# Patient Record
Sex: MALE | Race: WHITE | NOT HISPANIC OR LATINO
[De-identification: names, ages, dates, MRNs, and addresses within clinical notes are randomized per-mention and may not be internally consistent; named-entity substitution may affect disease eponyms.]

---

## 2017-10-25 ENCOUNTER — APPOINTMENT (OUTPATIENT)
Dept: PHARMACY | Facility: CLINIC | Age: 77
End: 2017-10-25
Payer: MEDICARE

## 2017-10-25 ENCOUNTER — APPOINTMENT (OUTPATIENT)
Dept: OTOLARYNGOLOGY | Facility: CLINIC | Age: 77
End: 2017-10-25
Payer: MEDICARE

## 2017-10-25 VITALS — DIASTOLIC BLOOD PRESSURE: 81 MMHG | SYSTOLIC BLOOD PRESSURE: 151 MMHG | HEART RATE: 91 BPM | OXYGEN SATURATION: 94 %

## 2017-10-25 DIAGNOSIS — H61.21 IMPACTED CERUMEN, RIGHT EAR: ICD-10-CM

## 2017-10-25 PROCEDURE — V5299A: CUSTOM | Mod: NC

## 2017-10-25 PROCEDURE — 99213 OFFICE O/P EST LOW 20 MIN: CPT | Mod: 25

## 2017-10-25 PROCEDURE — 69210 REMOVE IMPACTED EAR WAX UNI: CPT

## 2017-10-25 PROCEDURE — 92550 TYMPANOMETRY & REFLEX THRESH: CPT

## 2017-10-25 PROCEDURE — 92557 COMPREHENSIVE HEARING TEST: CPT

## 2018-02-12 ENCOUNTER — APPOINTMENT (OUTPATIENT)
Dept: OTOLARYNGOLOGY | Facility: CLINIC | Age: 78
End: 2018-02-12
Payer: SELF-PAY

## 2018-02-12 PROCEDURE — V5299A: CUSTOM | Mod: NC

## 2018-02-26 ENCOUNTER — APPOINTMENT (OUTPATIENT)
Dept: PHARMACY | Facility: CLINIC | Age: 78
End: 2018-02-26
Payer: SELF-PAY

## 2018-02-26 PROCEDURE — V5299A: CUSTOM | Mod: NC

## 2018-04-25 ENCOUNTER — APPOINTMENT (OUTPATIENT)
Dept: PHARMACY | Facility: CLINIC | Age: 78
End: 2018-04-25
Payer: SELF-PAY

## 2018-04-25 ENCOUNTER — APPOINTMENT (OUTPATIENT)
Dept: OTOLARYNGOLOGY | Facility: CLINIC | Age: 78
End: 2018-04-25
Payer: MEDICARE

## 2018-04-25 VITALS
TEMPERATURE: 97.9 F | DIASTOLIC BLOOD PRESSURE: 82 MMHG | OXYGEN SATURATION: 97 % | SYSTOLIC BLOOD PRESSURE: 137 MMHG | HEART RATE: 77 BPM

## 2018-04-25 PROCEDURE — 99213 OFFICE O/P EST LOW 20 MIN: CPT | Mod: 25

## 2018-04-25 PROCEDURE — 92567 TYMPANOMETRY: CPT

## 2018-04-25 PROCEDURE — 92557 COMPREHENSIVE HEARING TEST: CPT

## 2018-04-25 PROCEDURE — 69210 REMOVE IMPACTED EAR WAX UNI: CPT

## 2018-04-25 PROCEDURE — V5299A: CUSTOM | Mod: NC

## 2018-07-31 ENCOUNTER — APPOINTMENT (OUTPATIENT)
Dept: OTOLARYNGOLOGY | Facility: CLINIC | Age: 78
End: 2018-07-31
Payer: SELF-PAY

## 2018-07-31 PROCEDURE — V5299A: CUSTOM | Mod: NC

## 2018-10-24 ENCOUNTER — APPOINTMENT (OUTPATIENT)
Dept: OTOLARYNGOLOGY | Facility: CLINIC | Age: 78
End: 2018-10-24
Payer: MEDICARE

## 2018-10-24 VITALS
TEMPERATURE: 98.3 F | HEART RATE: 79 BPM | DIASTOLIC BLOOD PRESSURE: 85 MMHG | OXYGEN SATURATION: 95 % | RESPIRATION RATE: 15 BRPM | SYSTOLIC BLOOD PRESSURE: 130 MMHG

## 2018-10-24 PROCEDURE — 69210 REMOVE IMPACTED EAR WAX UNI: CPT

## 2018-10-24 PROCEDURE — 92550 TYMPANOMETRY & REFLEX THRESH: CPT

## 2018-10-24 PROCEDURE — 92557 COMPREHENSIVE HEARING TEST: CPT

## 2018-10-24 PROCEDURE — 99213 OFFICE O/P EST LOW 20 MIN: CPT | Mod: 25

## 2019-01-02 ENCOUNTER — APPOINTMENT (OUTPATIENT)
Dept: PHARMACY | Facility: CLINIC | Age: 79
End: 2019-01-02
Payer: SELF-PAY

## 2019-01-02 PROCEDURE — V5299D: CUSTOM

## 2019-03-12 ENCOUNTER — APPOINTMENT (OUTPATIENT)
Dept: PHARMACY | Facility: CLINIC | Age: 79
End: 2019-03-12
Payer: SELF-PAY

## 2019-03-12 PROCEDURE — V5299D: CUSTOM

## 2019-09-17 ENCOUNTER — APPOINTMENT (OUTPATIENT)
Dept: OTOLARYNGOLOGY | Facility: CLINIC | Age: 79
End: 2019-09-17
Payer: MEDICARE

## 2019-09-17 ENCOUNTER — APPOINTMENT (OUTPATIENT)
Dept: PHARMACY | Facility: CLINIC | Age: 79
End: 2019-09-17
Payer: SELF-PAY

## 2019-09-17 PROCEDURE — 92557 COMPREHENSIVE HEARING TEST: CPT

## 2019-09-17 PROCEDURE — 92550 TYMPANOMETRY & REFLEX THRESH: CPT

## 2019-09-17 PROCEDURE — V5299A: CUSTOM | Mod: NC

## 2019-10-03 ENCOUNTER — APPOINTMENT (OUTPATIENT)
Dept: OTOLARYNGOLOGY | Facility: CLINIC | Age: 79
End: 2019-10-03
Payer: MEDICARE

## 2019-10-03 VITALS
TEMPERATURE: 97.9 F | HEART RATE: 69 BPM | DIASTOLIC BLOOD PRESSURE: 94 MMHG | OXYGEN SATURATION: 97 % | SYSTOLIC BLOOD PRESSURE: 157 MMHG

## 2019-10-03 PROCEDURE — 99213 OFFICE O/P EST LOW 20 MIN: CPT | Mod: 25

## 2019-10-03 PROCEDURE — 69210 REMOVE IMPACTED EAR WAX UNI: CPT

## 2019-10-03 NOTE — PHYSICAL EXAM
[de-identified] : b moderate obstructinc cerumen removed atraumatically with suction [Normal] : no masses and lesions seen, face is symmetric

## 2019-10-03 NOTE — PROCEDURE
[Cerumen Impaction] : Cerumen Impaction [Same] : same as the Pre Op Dx. [FreeTextEntry6] : b obstructing cerumen removed atraumatically with suction [] : Removal of Cerumen

## 2019-10-03 NOTE — HISTORY OF PRESENT ILLNESS
[de-identified] : followup 78 yo man with b snhl and tinnitus. He is a b ha user and finds the HAs suppress his tinnitus but when he takes off the left one, it can be louder. He denies vertigo or pain. He also notes that even with adjustments he has trouble hearing over bckgd noise. Last adjusted here last month.

## 2020-08-28 ENCOUNTER — APPOINTMENT (OUTPATIENT)
Dept: PHARMACY | Facility: CLINIC | Age: 80
End: 2020-08-28
Payer: SELF-PAY

## 2020-08-28 PROCEDURE — V5254B: CUSTOM

## 2020-08-28 PROCEDURE — V5110: CPT

## 2020-08-28 PROCEDURE — V5267F: CUSTOM

## 2020-08-28 PROCEDURE — V5266B: CUSTOM

## 2020-09-08 ENCOUNTER — APPOINTMENT (OUTPATIENT)
Dept: PHARMACY | Facility: CLINIC | Age: 80
End: 2020-09-08
Payer: MEDICARE

## 2020-09-08 PROCEDURE — V5299A: CUSTOM | Mod: NC

## 2020-11-04 ENCOUNTER — RX RENEWAL (OUTPATIENT)
Age: 80
End: 2020-11-04

## 2020-11-04 ENCOUNTER — APPOINTMENT (OUTPATIENT)
Dept: UROLOGY | Facility: CLINIC | Age: 80
End: 2020-11-04
Payer: MEDICARE

## 2020-11-04 VITALS — DIASTOLIC BLOOD PRESSURE: 72 MMHG | TEMPERATURE: 97.5 F | SYSTOLIC BLOOD PRESSURE: 118 MMHG | HEART RATE: 99 BPM

## 2020-11-04 PROCEDURE — 99204 OFFICE O/P NEW MOD 45 MIN: CPT | Mod: 25

## 2020-11-04 PROCEDURE — 51798 US URINE CAPACITY MEASURE: CPT

## 2020-11-05 LAB
APPEARANCE: CLEAR
BACTERIA: NEGATIVE
BILIRUBIN URINE: NEGATIVE
BLOOD URINE: NEGATIVE
COLOR: NORMAL
GLUCOSE QUALITATIVE U: NEGATIVE
HYALINE CASTS: 0 /LPF
KETONES URINE: NEGATIVE
LEUKOCYTE ESTERASE URINE: NEGATIVE
MICROSCOPIC-UA: NORMAL
NITRITE URINE: NEGATIVE
PH URINE: 5.5
PROTEIN URINE: NEGATIVE
RED BLOOD CELLS URINE: 2 /HPF
SPECIFIC GRAVITY URINE: 1.02
SQUAMOUS EPITHELIAL CELLS: 0 /HPF
UROBILINOGEN URINE: NORMAL
WHITE BLOOD CELLS URINE: 2 /HPF

## 2020-11-05 NOTE — PHYSICAL EXAM
[General Appearance - Well Developed] : well developed [General Appearance - Well Nourished] : well nourished [Normal Appearance] : normal appearance [Well Groomed] : well groomed [General Appearance - In No Acute Distress] : no acute distress [Edema] : no peripheral edema [Respiration, Rhythm And Depth] : normal respiratory rhythm and effort [Exaggerated Use Of Accessory Muscles For Inspiration] : no accessory muscle use [Abdomen Soft] : soft [Abdomen Tenderness] : non-tender [Costovertebral Angle Tenderness] : no ~M costovertebral angle tenderness [No Prostate Nodules] : no prostate nodules [Prostate Size ___ gm] : prostate size [unfilled] gm [FreeTextEntry1] : PVR = 83cc [Normal Station and Gait] : the gait and station were normal for the patient's age [] : no rash [No Focal Deficits] : no focal deficits [Oriented To Time, Place, And Person] : oriented to person, place, and time [Affect] : the affect was normal [Mood] : the mood was normal [Not Anxious] : not anxious

## 2020-11-05 NOTE — ASSESSMENT
[FreeTextEntry1] : 81yo male with BPH, mild-moderate LUTS, bothersome\par Prostate enlarged on exam\par PSA slightly elevated, considering his age, would observe for now\par Recommend trial of tamsulosin\par F/u 3 months

## 2020-11-05 NOTE — LETTER BODY
[Dear  ___] : Dear  [unfilled], [Courtesy Letter:] : I had the pleasure of seeing your patient, [unfilled], in my office today. [Please see my note below.] : Please see my note below. [Consult Closing:] : Thank you very much for allowing me to participate in the care of this patient.  If you have any questions, please do not hesitate to contact me. [Sincerely,] : Sincerely, [FreeTextEntry2] : Omar Barrera MD\par [address]\[place] [address] [FreeTextEntry3] : Qasim Kim MD, FACS\par Urologic Oncology\par Department of Urology\par [facility]\par \par [employer] [facility] \par \par

## 2020-11-05 NOTE — HISTORY OF PRESENT ILLNESS
[FreeTextEntry1] : 79yo male here for BPH evaluation. Noted worsening symptoms since starting Spiriva. AUASS = 11. He is , not sexually active. PSA = 7.7, no prior PSA available. No prior biopsy or MRI.  [Urinary Urgency] : urinary urgency [Urinary Frequency] : urinary frequency [Nocturia] : nocturia [Weak Stream] : weak stream [Erectile Dysfunction] : Erectile Dysfunction [None] : None

## 2020-11-06 LAB — BACTERIA UR CULT: NORMAL

## 2020-11-12 ENCOUNTER — OUTPATIENT (OUTPATIENT)
Dept: OUTPATIENT SERVICES | Facility: HOSPITAL | Age: 80
LOS: 1 days | End: 2020-11-12

## 2020-11-12 ENCOUNTER — APPOINTMENT (OUTPATIENT)
Dept: ULTRASOUND IMAGING | Facility: CLINIC | Age: 80
End: 2020-11-12
Payer: MEDICARE

## 2020-11-12 ENCOUNTER — APPOINTMENT (OUTPATIENT)
Dept: NEPHROLOGY | Facility: CLINIC | Age: 80
End: 2020-11-12
Payer: MEDICARE

## 2020-11-12 VITALS
BODY MASS INDEX: 25.71 KG/M2 | DIASTOLIC BLOOD PRESSURE: 68 MMHG | WEIGHT: 160 LBS | HEART RATE: 80 BPM | HEIGHT: 66 IN | SYSTOLIC BLOOD PRESSURE: 112 MMHG

## 2020-11-12 DIAGNOSIS — H90.5 UNSPECIFIED SENSORINEURAL HEARING LOSS: ICD-10-CM

## 2020-11-12 PROCEDURE — 76770 US EXAM ABDO BACK WALL COMP: CPT | Mod: 26

## 2020-11-12 PROCEDURE — 99205 OFFICE O/P NEW HI 60 MIN: CPT

## 2020-11-12 NOTE — HISTORY OF PRESENT ILLNESS
[FreeTextEntry1] : 80-year-old man with a history of CKD now stage IV, with no proteinuria, creatinine 2.17, BUN 56, K4.7, GFR 28, calcium 10.8, A1c 5.3.  His records indicate that his creatinine was 1.3 in 2016 and 2018, and 1.4 in 2019 .   He is referred by Dr. Omar Barrera and just saw his urologist, Dr Kim re: BPH.  There is a history of sarcoidosis.  He has recently experienced a dramatic increase in STONER, and the cause is not yet clear.  Theories have included sarcoidosis, COPD related to remote smoking history or other issues.  He has a number of ENT issues including hearing loss, sensorineural, bilateral.  His sarcoidosis was diagnosed over 20 years ago and his pulmonary function was stable for decades until recently when PFTs deteriorated and he experienced new STONER.  He was recently treated with steroids for 8 days, probably a Medrol Dosepak.  He subsequently gained 8 pounds.  That may also explain his prerenal azotemia with the current BUN of 56.  He began vitamin D, 50,000 units weekly several weeks ago.  He also has used Aleve 4 times a week before swimming, for the last 3 months.

## 2020-11-12 NOTE — REVIEW OF SYSTEMS
[Loss Of Hearing] : hearing loss [SOB on Exertion] : shortness of breath during exertion [Hesitancy] : urinary hesitancy [Nocturia] : nocturia [Negative] : Heme/Lymph

## 2020-11-12 NOTE — ASSESSMENT
[FreeTextEntry1] : 80-year-old man with CKD 4, and a creatinine that has risen from 1.4 up to the range of 1.9-2.2 in the last year.  The etiology is not yet clear.  He has a history of sarcoidosis and currently has mild hypercalcemia, which could be the result of sarcoidosis per se, perhaps compounded by recent use of vitamin D, or other unidentified factors.  I suspect that his worsening CKD is multifactorial, perhaps the combination of NSAIDs, hypercalcemia, and sarcoid nephropathy - all 3 tend to cause interstitial nephropathy, which would be consistent with the absence of proteinuria currently.  I have asked him to hold vitamin D for now, reduce allopurinol dose because of CKD and reduced excretion (uric acid and gout itself have been well controlled).  I have ordered renal ultrasound to assess kidney size, echogenicity, and rule out obstructive uropathy.  I have ordered additional labs to include PTH, phosphorus, angiotensin-converting enzyme level, screening for light chains, hepatitis B surface antigen, hepatitis C antibody etc. his BP is superbly controlled on amlodipine and losartan, which will not be altered.

## 2020-11-12 NOTE — CONSULT LETTER
[Dear  ___] : Dear  [unfilled], [Consult Letter:] : I had the pleasure of evaluating your patient, [unfilled]. [Please see my note below.] : Please see my note below. [Consult Closing:] : Thank you very much for allowing me to participate in the care of this patient.  If you have any questions, please do not hesitate to contact me. [Sincerely,] : Sincerely, [FreeTextEntry2] : Dr Omar Barrera ([facility]) [FreeTextEntry3] : Sincerely, \par \par Shaun Hines MD, FACP

## 2020-11-12 NOTE — PHYSICAL EXAM
[General Appearance - Alert] : alert [General Appearance - In No Acute Distress] : in no acute distress [Sclera] : the sclera and conjunctiva were normal [PERRL With Normal Accommodation] : pupils were equal in size, round, and reactive to light [Outer Ear] : the ears and nose were normal in appearance [Neck Appearance] : the appearance of the neck was normal [Neck Cervical Mass (___cm)] : no neck mass was observed [Jugular Venous Distention Increased] : there was no jugular-venous distention [Auscultation Breath Sounds / Voice Sounds] : lungs were clear to auscultation bilaterally [Heart Rate And Rhythm] : heart rate was normal and rhythm regular [Heart Sounds] : normal S1 and S2 [Heart Sounds Gallop] : no gallops [Murmurs] : no murmurs [Heart Sounds Pericardial Friction Rub] : no pericardial rub [Edema] : there was no peripheral edema [Cervical Lymph Nodes Enlarged Posterior Bilaterally] : posterior cervical [Cervical Lymph Nodes Enlarged Anterior Bilaterally] : anterior cervical [Supraclavicular Lymph Nodes Enlarged Bilaterally] : supraclavicular [No CVA Tenderness] : no ~M costovertebral angle tenderness [Abnormal Walk] : normal gait [Nail Clubbing] : no clubbing  or cyanosis of the fingernails [Musculoskeletal - Swelling] : no joint swelling seen [Motor Tone] : muscle strength and tone were normal [Skin Color & Pigmentation] : normal skin color and pigmentation [Skin Turgor] : normal skin turgor [] : no rash [Deep Tendon Reflexes (DTR)] : deep tendon reflexes were 2+ and symmetric [No Focal Deficits] : no focal deficits [Oriented To Time, Place, And Person] : oriented to person, place, and time [Impaired Insight] : insight and judgment were intact [Affect] : the affect was normal

## 2020-11-13 ENCOUNTER — TRANSCRIPTION ENCOUNTER (OUTPATIENT)
Age: 80
End: 2020-11-13

## 2020-11-16 LAB
24R-OH-CALCIDIOL SERPL-MCNC: 53.9 PG/ML
25(OH)D3 SERPL-MCNC: 47.2 NG/ML
ACE BLD-CCNC: 73 U/L
ANION GAP SERPL CALC-SCNC: 13 MMOL/L
BUN SERPL-MCNC: 48 MG/DL
CALCIUM SERPL-MCNC: 10.2 MG/DL
CALCIUM SERPL-MCNC: 10.2 MG/DL
CHLORIDE SERPL-SCNC: 107 MMOL/L
CO2 SERPL-SCNC: 23 MMOL/L
CREAT SERPL-MCNC: 2.75 MG/DL
DEPRECATED KAPPA LC FREE/LAMBDA SER: 2.39 RATIO
GLUCOSE SERPL-MCNC: 83 MG/DL
HBV SURFACE AG SER QL: NONREACTIVE
HCV AB SER QL: NONREACTIVE
HCV S/CO RATIO: 0.12 S/CO
KAPPA LC CSF-MCNC: 4.16 MG/DL
KAPPA LC SERPL-MCNC: 9.94 MG/DL
PARATHYROID HORMONE INTACT: 8 PG/ML
PHOSPHATE SERPL-MCNC: 5.4 MG/DL
POTASSIUM SERPL-SCNC: 4.5 MMOL/L
SODIUM SERPL-SCNC: 142 MMOL/L

## 2020-11-19 ENCOUNTER — NON-APPOINTMENT (OUTPATIENT)
Age: 80
End: 2020-11-19

## 2020-11-23 ENCOUNTER — APPOINTMENT (OUTPATIENT)
Dept: UROLOGY | Facility: CLINIC | Age: 80
End: 2020-11-23
Payer: MEDICARE

## 2020-11-23 VITALS — SYSTOLIC BLOOD PRESSURE: 128 MMHG | HEART RATE: 98 BPM | DIASTOLIC BLOOD PRESSURE: 70 MMHG | TEMPERATURE: 97.7 F

## 2020-11-23 PROCEDURE — 52000 CYSTOURETHROSCOPY: CPT

## 2020-12-04 ENCOUNTER — APPOINTMENT (OUTPATIENT)
Dept: CT IMAGING | Facility: CLINIC | Age: 80
End: 2020-12-04
Payer: MEDICARE

## 2020-12-04 ENCOUNTER — OUTPATIENT (OUTPATIENT)
Dept: OUTPATIENT SERVICES | Facility: HOSPITAL | Age: 80
LOS: 1 days | End: 2020-12-04

## 2020-12-04 PROCEDURE — 71250 CT THORAX DX C-: CPT | Mod: 26

## 2020-12-15 ENCOUNTER — APPOINTMENT (OUTPATIENT)
Dept: HEMATOLOGY ONCOLOGY | Facility: CLINIC | Age: 80
End: 2020-12-15
Payer: MEDICARE

## 2020-12-15 VITALS
BODY MASS INDEX: 23.11 KG/M2 | SYSTOLIC BLOOD PRESSURE: 134 MMHG | TEMPERATURE: 97.3 F | HEART RATE: 97 BPM | HEIGHT: 66 IN | OXYGEN SATURATION: 96 % | DIASTOLIC BLOOD PRESSURE: 67 MMHG | WEIGHT: 143.8 LBS

## 2020-12-15 DIAGNOSIS — R06.02 SHORTNESS OF BREATH: ICD-10-CM

## 2020-12-15 PROCEDURE — 36415 COLL VENOUS BLD VENIPUNCTURE: CPT

## 2020-12-15 PROCEDURE — 99204 OFFICE O/P NEW MOD 45 MIN: CPT | Mod: 25

## 2020-12-15 RX ORDER — PREGABALIN 25 MG/1
25 CAPSULE ORAL
Refills: 0 | Status: ACTIVE | COMMUNITY

## 2020-12-16 ENCOUNTER — TRANSCRIPTION ENCOUNTER (OUTPATIENT)
Age: 80
End: 2020-12-16

## 2020-12-16 ENCOUNTER — APPOINTMENT (OUTPATIENT)
Dept: NEPHROLOGY | Facility: CLINIC | Age: 80
End: 2020-12-16
Payer: MEDICARE

## 2020-12-16 DIAGNOSIS — D47.2 MONOCLONAL GAMMOPATHY: ICD-10-CM

## 2020-12-16 DIAGNOSIS — E83.52 HYPERCALCEMIA: ICD-10-CM

## 2020-12-16 DIAGNOSIS — Z87.448 PERSONAL HISTORY OF OTHER DISEASES OF URINARY SYSTEM: ICD-10-CM

## 2020-12-16 DIAGNOSIS — D86.9 SARCOIDOSIS, UNSPECIFIED: ICD-10-CM

## 2020-12-16 DIAGNOSIS — Z87.891 PERSONAL HISTORY OF NICOTINE DEPENDENCE: ICD-10-CM

## 2020-12-16 DIAGNOSIS — E87.2 ACIDOSIS: ICD-10-CM

## 2020-12-16 DIAGNOSIS — Z72.89 OTHER PROBLEMS RELATED TO LIFESTYLE: ICD-10-CM

## 2020-12-16 PROCEDURE — 99443: CPT | Mod: 95

## 2020-12-16 RX ORDER — SODIUM BICARBONATE 650 MG/1
650 TABLET ORAL TWICE DAILY
Qty: 180 | Refills: 1 | Status: ACTIVE | COMMUNITY
Start: 2020-12-16 | End: 1900-01-01

## 2020-12-17 ENCOUNTER — TRANSCRIPTION ENCOUNTER (OUTPATIENT)
Age: 80
End: 2020-12-17

## 2020-12-21 ENCOUNTER — TRANSCRIPTION ENCOUNTER (OUTPATIENT)
Age: 80
End: 2020-12-21

## 2020-12-21 NOTE — HISTORY OF PRESENT ILLNESS
[de-identified] : 80 years old white male past medical history significant for hypertension controlled with medication... Most recently he became short of breath was seen by pulmonologist Dr. Ng at [facility]... He was also noted to have rapidly worsening kidney function, and has now chronic renal failure stage IV... He is here for work-up for a monoclonal gammopathy.\par \par Patient has a history of smoking for about 10 years stopped in 1979... History of pulmonary sarcoid, which has not been very active most recently...

## 2020-12-21 NOTE — ASSESSMENT
[FreeTextEntry1] : Repeat blood work shows patient to have a stable kappa light chain, as well as slight anemia, chronic renal failure and hypercalcemia (patient has history of sarcoid...)\par \par \par In view of the presence of the light chain, kidney dysfunction, anemia, and hypercalcemia I recommend a bone marrow aspirate and biopsy to rule out myeloma.\par \par All this was discussed with Dr. Brannon Hines patient's nephrologist, and he agrees.\par \par \par I attempted to call patient, but since there was no answer on his cell phone I will send him a written message on [facility].\par \par Thanks\par

## 2020-12-21 NOTE — CONSULT LETTER
[Dear  ___] : Dear  [unfilled], [Consult Letter:] : I had the pleasure of evaluating your patient, [unfilled]. [Please see my note below.] : Please see my note below. [Consult Closing:] : Thank you very much for allowing me to participate in the care of this patient.  If you have any questions, please do not hesitate to contact me. [Sincerely,] : Sincerely, [FreeTextEntry3] : Carla Hackett MD\par

## 2020-12-22 ENCOUNTER — TRANSCRIPTION ENCOUNTER (OUTPATIENT)
Age: 80
End: 2020-12-22

## 2020-12-22 ENCOUNTER — NON-APPOINTMENT (OUTPATIENT)
Age: 80
End: 2020-12-22

## 2020-12-22 ENCOUNTER — LABORATORY RESULT (OUTPATIENT)
Age: 80
End: 2020-12-22

## 2020-12-23 ENCOUNTER — LABORATORY RESULT (OUTPATIENT)
Age: 80
End: 2020-12-23

## 2020-12-23 ENCOUNTER — APPOINTMENT (OUTPATIENT)
Dept: HEMATOLOGY ONCOLOGY | Facility: CLINIC | Age: 80
End: 2020-12-23
Payer: MEDICARE

## 2020-12-23 VITALS — DIASTOLIC BLOOD PRESSURE: 76 MMHG | SYSTOLIC BLOOD PRESSURE: 130 MMHG

## 2020-12-23 VITALS
HEART RATE: 81 BPM | SYSTOLIC BLOOD PRESSURE: 148 MMHG | TEMPERATURE: 97.2 F | OXYGEN SATURATION: 97 % | BODY MASS INDEX: 23.14 KG/M2 | HEIGHT: 66 IN | DIASTOLIC BLOOD PRESSURE: 79 MMHG | WEIGHT: 144 LBS

## 2020-12-23 PROCEDURE — 38221 DX BONE MARROW BIOPSIES: CPT

## 2020-12-23 PROCEDURE — 99214 OFFICE O/P EST MOD 30 MIN: CPT | Mod: 25

## 2020-12-23 PROCEDURE — 38220 DX BONE MARROW ASPIRATIONS: CPT | Mod: 59

## 2020-12-24 ENCOUNTER — LABORATORY RESULT (OUTPATIENT)
Age: 80
End: 2020-12-24

## 2020-12-29 ENCOUNTER — TRANSCRIPTION ENCOUNTER (OUTPATIENT)
Age: 80
End: 2020-12-29

## 2020-12-29 VITALS — HEIGHT: 66 IN | WEIGHT: 144 LBS | BODY MASS INDEX: 23.14 KG/M2

## 2020-12-29 PROBLEM — D47.2 MGUS (MONOCLONAL GAMMOPATHY OF UNKNOWN SIGNIFICANCE): Status: ACTIVE | Noted: 2020-12-15

## 2020-12-29 PROBLEM — E87.2 METABOLIC ACIDOSIS: Status: ACTIVE | Noted: 2020-11-12

## 2020-12-29 PROBLEM — Z87.448 HISTORY OF CHRONIC KIDNEY DISEASE: Status: RESOLVED | Noted: 2020-11-04 | Resolved: 2020-12-29

## 2020-12-29 PROBLEM — E83.52 HYPERCALCEMIA: Status: ACTIVE | Noted: 2020-11-12

## 2020-12-29 LAB
25(OH)D3 SERPL-MCNC: 42.3 NG/ML
ACE BLD-CCNC: 61 U/L
ALBUMIN MFR SERPL ELPH: 57.9 %
ALBUMIN SERPL ELPH-MCNC: 4.1 G/DL
ALBUMIN SERPL-MCNC: 4.2 G/DL
ALBUMIN/GLOB SERPL: 1.4 RATIO
ALP BLD-CCNC: 53 U/L
ALPHA1 GLOB MFR SERPL ELPH: 4.2 %
ALPHA1 GLOB SERPL ELPH-MCNC: 0.3 G/DL
ALPHA2 GLOB MFR SERPL ELPH: 9.5 %
ALPHA2 GLOB SERPL ELPH-MCNC: 0.7 G/DL
ALT SERPL-CCNC: 14 U/L
ANION GAP SERPL CALC-SCNC: 15 MMOL/L
AST SERPL-CCNC: 16 U/L
B-GLOBULIN MFR SERPL ELPH: 9.8 %
B-GLOBULIN SERPL ELPH-MCNC: 0.7 G/DL
B2 MICROGLOB SERPL-MCNC: 7.3 MG/L
BASOPHILS # BLD AUTO: 0.07 K/UL
BASOPHILS NFR BLD AUTO: 0.8 %
BILIRUB SERPL-MCNC: 0.5 MG/DL
BUN SERPL-MCNC: 54 MG/DL
CALCIUM SERPL-MCNC: 11.1 MG/DL
CHLORIDE SERPL-SCNC: 107 MMOL/L
CO2 SERPL-SCNC: 18 MMOL/L
CREAT SERPL-MCNC: 2.63 MG/DL
DEPRECATED KAPPA LC FREE/LAMBDA SER: 2.19 RATIO
EOSINOPHIL # BLD AUTO: 0.13 K/UL
EOSINOPHIL NFR BLD AUTO: 1.6 %
ERYTHROCYTE [SEDIMENTATION RATE] IN BLOOD BY WESTERGREN METHOD: 27 MM/HR
GAMMA GLOB FLD ELPH-MCNC: 1.4 G/DL
GAMMA GLOB MFR SERPL ELPH: 18.6 %
GLUCOSE SERPL-MCNC: 102 MG/DL
HCT VFR BLD CALC: 39.9 %
HGB BLD-MCNC: 12.5 G/DL
IGA SER QL IEP: 246 MG/DL
IGG SER QL IEP: 1303 MG/DL
IGM SER QL IEP: 184 MG/DL
IMM GRANULOCYTES NFR BLD AUTO: 0.4 %
INTERPRETATION SERPL IEP-IMP: NORMAL
KAPPA LC CSF-MCNC: 4.45 MG/DL
KAPPA LC SERPL-MCNC: 9.74 MG/DL
LYMPHOCYTES # BLD AUTO: 0.96 K/UL
LYMPHOCYTES NFR BLD AUTO: 11.6 %
M PROTEIN SPEC IFE-MCNC: NORMAL
MAN DIFF?: NORMAL
MCHC RBC-ENTMCNC: 29.6 PG
MCHC RBC-ENTMCNC: 31.3 GM/DL
MCV RBC AUTO: 94.5 FL
MONOCYTES # BLD AUTO: 0.54 K/UL
MONOCYTES NFR BLD AUTO: 6.5 %
NEUTROPHILS # BLD AUTO: 6.53 K/UL
NEUTROPHILS NFR BLD AUTO: 79.1 %
PLATELET # BLD AUTO: 252 K/UL
POTASSIUM SERPL-SCNC: 4.2 MMOL/L
PROT SERPL-MCNC: 6.8 G/DL
PROT SERPL-MCNC: 7.3 G/DL
PROT SERPL-MCNC: 7.3 G/DL
RBC # BLD: 4.22 M/UL
RBC # FLD: 15.4 %
SARS-COV-2 IGG SERPL IA-ACNC: <0.1 INDEX
SARS-COV-2 IGG SERPL QL IA: NEGATIVE
SODIUM SERPL-SCNC: 139 MMOL/L
TSH SERPL-ACNC: 1.88 UIU/ML
VIT B12 SERPL-MCNC: 858 PG/ML
WBC # FLD AUTO: 8.26 K/UL

## 2020-12-29 NOTE — CONSULT LETTER
[Dear  ___] : Dear  [unfilled], [Consult Letter:] : I had the pleasure of evaluating your patient, [unfilled]. [Please see my note below.] : Please see my note below. [Consult Closing:] : Thank you very much for allowing me to participate in the care of this patient.  If you have any questions, please do not hesitate to contact me. [Sincerely,] : Sincerely, [FreeTextEntry3] : Carla Hackett MD\par  [DrSharon  ___] : Dr. FUNEZ

## 2020-12-29 NOTE — PHYSICAL EXAM
[General Appearance - Alert] : alert [General Appearance - In No Acute Distress] : in no acute distress [] : no respiratory distress [Oriented To Time, Place, And Person] : oriented to person, place, and time [Impaired Insight] : insight and judgment were intact [Affect] : the affect was normal

## 2020-12-29 NOTE — ASSESSMENT
[FreeTextEntry1] : Repeat blood work shows patient to have a stable kappa light chain, as well as slight anemia, chronic renal failure and hypercalcemia (patient has history of sarcoid...)\par \par \par In view of the presence of the light chain, kidney dysfunction, anemia, and hypercalcemia patient underwent the bone marrow aspirate and biopsy, which was negative for myeloma.\par \par Since he was found to have MGUS as well as worsening renal failure I am wondering if the 2 are connected... Please see article  [email or web address]\par \par \par \par Follow-up here in 3 months or sooner if needed.

## 2020-12-29 NOTE — ASSESSMENT
[FreeTextEntry1] : 80-year-old man with stage IV CKD, with concerns about MGUS and possible emergence of multiple myeloma -our index of suspicion is not terribly high, but his hypercalcemia, mild anemia, kappa to lambda ratio, and beta-2 microglobulin all raise some question.  He is not on vitamin D or calcium supplements.  We will continue sodium bicarbonate for metabolic acidosis.  I would like to see his hypercalcemia improved, but our options are somewhat limited.  He has agreed to see Dr. Carla Hackett for hematologic evaluation and possible bone marrow aspiration.. I am reluctant to initiate loop diuretic in effort to lower his calcium, not wanting to volume deplete him and worsen his renal function.  His PTH is not elevated so even an empirical trial of Sensipar does not appear warranted, and it would be very difficult to obtain.  Time spent was 21 minutes

## 2020-12-29 NOTE — HISTORY OF PRESENT ILLNESS
[Home] : at home, [unfilled] , at the time of the visit. [Medical Office: ([facility])___] : at the medical office located in  [Verbal consent obtained from patient] : the patient, [unfilled] [FreeTextEntry1] : Discussed with patient : You have chosen to receive care through the use of tele-media.  It enables healthcare providers at different locations to provide safe, effective, and convenient care through the use of technology.  Please note this is a billable encounter.  As with any healthcare service, there are risks associated with the use of tele-media, including  issues.  You understand that I cannot physically examine you and that you may need to come to the office to complete the assessment.   Patient agreed verbally and understands the risks and benefits of tele-media as explained.  All questions regarding tele-media encounters were answered.\par           80-year-old man with a history of CKD 4, whose creatinine is 2.63 with a BUN of 54, GFR 22, but no overt uremic symptoms or signs yet.  He also exhibits hypercalcemia with a calcium of 11.1, and a history of sarcoidosis -his vitamin D levels are normal, both 25 and 1, 25.  His PTH level is actually suppressed, only measuring 8.  His beta-2 microglobulin is elevated.  His kappa to lambda light chain ratio is elevated, and so the question of MGUS possibly progressing to myeloma is open to question.  He has metabolic acidosis and is currently on sodium bicarb .  His phosphorus is slightly elevated at 5.4 and will likely require a binder to be started.

## 2020-12-29 NOTE — REVIEW OF SYSTEMS
[Feeling Tired] : feeling tired [SOB on Exertion] : shortness of breath during exertion [Negative] : Heme/Lymph

## 2020-12-29 NOTE — HISTORY OF PRESENT ILLNESS
[de-identified] : 80 years old white male past medical history significant for hypertension controlled with medication... Most recently he became short of breath was seen by pulmonologist Dr. Ng at [facility]... He was also noted to have rapidly worsening kidney function, and has now chronic renal failure stage IV... He is here for work-up for a monoclonal gammopathy.\par \par Patient has a history of smoking for about 10 years stopped in 1979... History of pulmonary sarcoid, which has not been very active most recently...

## 2020-12-30 ENCOUNTER — TRANSCRIPTION ENCOUNTER (OUTPATIENT)
Age: 80
End: 2020-12-30

## 2020-12-30 ENCOUNTER — APPOINTMENT (OUTPATIENT)
Dept: PHARMACY | Facility: CLINIC | Age: 80
End: 2020-12-30
Payer: MEDICARE

## 2020-12-30 ENCOUNTER — NON-APPOINTMENT (OUTPATIENT)
Age: 80
End: 2020-12-30

## 2020-12-30 PROCEDURE — V5299A: CUSTOM | Mod: NC

## 2020-12-31 RX ORDER — TORSEMIDE 20 MG/1
20 TABLET ORAL DAILY
Qty: 90 | Refills: 0 | Status: ACTIVE | COMMUNITY
Start: 2020-12-30 | End: 1900-01-01

## 2021-01-04 ENCOUNTER — TRANSCRIPTION ENCOUNTER (OUTPATIENT)
Age: 81
End: 2021-01-04

## 2021-01-05 ENCOUNTER — TRANSCRIPTION ENCOUNTER (OUTPATIENT)
Age: 81
End: 2021-01-05

## 2021-01-06 ENCOUNTER — APPOINTMENT (OUTPATIENT)
Dept: PHARMACY | Facility: CLINIC | Age: 81
End: 2021-01-06
Payer: MEDICARE

## 2021-01-06 PROCEDURE — V5299A: CUSTOM | Mod: NC

## 2021-01-12 LAB
ANION GAP SERPL CALC-SCNC: 14 MMOL/L
BUN SERPL-MCNC: 55 MG/DL
CALCIUM SERPL-MCNC: 9 MG/DL
CHLORIDE SERPL-SCNC: 103 MMOL/L
CO2 SERPL-SCNC: 24 MMOL/L
CREAT SERPL-MCNC: 2.39 MG/DL
GLUCOSE SERPL-MCNC: 66 MG/DL
POTASSIUM SERPL-SCNC: 4.1 MMOL/L
SODIUM SERPL-SCNC: 141 MMOL/L
URATE SERPL-MCNC: 5.3 MG/DL

## 2021-01-13 ENCOUNTER — TRANSCRIPTION ENCOUNTER (OUTPATIENT)
Age: 81
End: 2021-01-13

## 2021-02-22 ENCOUNTER — APPOINTMENT (OUTPATIENT)
Dept: UROLOGY | Facility: CLINIC | Age: 81
End: 2021-02-22
Payer: MEDICARE

## 2021-02-22 VITALS
TEMPERATURE: 97.2 F | HEIGHT: 65.5 IN | WEIGHT: 150 LBS | BODY MASS INDEX: 24.69 KG/M2 | DIASTOLIC BLOOD PRESSURE: 67 MMHG | SYSTOLIC BLOOD PRESSURE: 137 MMHG | HEART RATE: 90 BPM

## 2021-02-22 DIAGNOSIS — R97.20 ELEVATED PROSTATE, SPECIFIC ANTIGEN [PSA]: ICD-10-CM

## 2021-02-22 DIAGNOSIS — N18.4 CHRONIC KIDNEY DISEASE, STAGE 4 (SEVERE): ICD-10-CM

## 2021-02-22 DIAGNOSIS — N21.0 CALCULUS IN BLADDER: ICD-10-CM

## 2021-02-22 PROCEDURE — 99213 OFFICE O/P EST LOW 20 MIN: CPT | Mod: 25

## 2021-02-22 PROCEDURE — 51798 US URINE CAPACITY MEASURE: CPT

## 2021-02-22 NOTE — ASSESSMENT
[FreeTextEntry1] : 79yo male with BPH, mild-moderate LUTS, bothersome\par Continue tamsulosin\par F/u 1 year

## 2021-02-22 NOTE — HISTORY OF PRESENT ILLNESS
[FreeTextEntry1] : 79yo male here for BPH evaluation. Noted worsening symptoms since starting Spiriva. AUASS = 11. He is , not sexually active. PSA = 7.7, no prior PSA available. No prior biopsy or MRI. \par \par 2/22/21 Here for f/u. Doing well. Started something for ED. BPH with mild symptoms.  [Nocturia] : nocturia [Post-Void Dribbling] : post-void dribbling [Erectile Dysfunction] : Erectile Dysfunction [None] : None

## 2021-02-23 ENCOUNTER — APPOINTMENT (OUTPATIENT)
Dept: OTOLARYNGOLOGY | Facility: CLINIC | Age: 81
End: 2021-02-23
Payer: MEDICARE

## 2021-02-23 PROCEDURE — V5299A: CUSTOM | Mod: NC

## 2021-03-02 ENCOUNTER — APPOINTMENT (OUTPATIENT)
Dept: PHARMACY | Facility: CLINIC | Age: 81
End: 2021-03-02
Payer: MEDICARE

## 2021-03-02 PROCEDURE — V5299A: CUSTOM | Mod: NC

## 2021-03-26 ENCOUNTER — APPOINTMENT (OUTPATIENT)
Dept: PHARMACY | Facility: CLINIC | Age: 81
End: 2021-03-26
Payer: SELF-PAY

## 2021-03-26 PROCEDURE — V5274D: CUSTOM

## 2021-04-02 ENCOUNTER — TRANSCRIPTION ENCOUNTER (OUTPATIENT)
Age: 81
End: 2021-04-02

## 2021-04-02 ENCOUNTER — NON-APPOINTMENT (OUTPATIENT)
Age: 81
End: 2021-04-02

## 2021-04-02 ENCOUNTER — RX RENEWAL (OUTPATIENT)
Age: 81
End: 2021-04-02

## 2021-04-19 ENCOUNTER — TRANSCRIPTION ENCOUNTER (OUTPATIENT)
Age: 81
End: 2021-04-19

## 2021-04-20 ENCOUNTER — TRANSCRIPTION ENCOUNTER (OUTPATIENT)
Age: 81
End: 2021-04-20

## 2021-04-21 ENCOUNTER — TRANSCRIPTION ENCOUNTER (OUTPATIENT)
Age: 81
End: 2021-04-21

## 2021-04-22 RX ORDER — SILDENAFIL 50 MG/1
50 TABLET ORAL
Qty: 30 | Refills: 1 | Status: ACTIVE | COMMUNITY
Start: 2021-04-21 | End: 1900-01-01

## 2021-05-18 ENCOUNTER — APPOINTMENT (OUTPATIENT)
Dept: PHARMACY | Facility: CLINIC | Age: 81
End: 2021-05-18
Payer: MEDICARE

## 2021-05-18 PROCEDURE — V5299A: CUSTOM | Mod: NC

## 2021-06-15 ENCOUNTER — NON-APPOINTMENT (OUTPATIENT)
Age: 81
End: 2021-06-15

## 2021-07-14 ENCOUNTER — TRANSCRIPTION ENCOUNTER (OUTPATIENT)
Age: 81
End: 2021-07-14

## 2021-07-19 ENCOUNTER — TRANSCRIPTION ENCOUNTER (OUTPATIENT)
Age: 81
End: 2021-07-19

## 2021-10-03 ENCOUNTER — RX RENEWAL (OUTPATIENT)
Age: 81
End: 2021-10-03

## 2021-10-03 RX ORDER — ALFUZOSIN HYDROCHLORIDE 10 MG/1
10 TABLET, EXTENDED RELEASE ORAL
Qty: 90 | Refills: 3 | Status: ACTIVE | COMMUNITY
Start: 2021-04-02 | End: 1900-01-01

## 2022-03-02 ENCOUNTER — APPOINTMENT (OUTPATIENT)
Dept: UROLOGY | Facility: CLINIC | Age: 82
End: 2022-03-02
Payer: MEDICARE

## 2022-03-02 VITALS
SYSTOLIC BLOOD PRESSURE: 109 MMHG | HEART RATE: 88 BPM | HEIGHT: 64 IN | TEMPERATURE: 97.3 F | DIASTOLIC BLOOD PRESSURE: 67 MMHG | BODY MASS INDEX: 25.95 KG/M2 | WEIGHT: 152 LBS

## 2022-03-02 PROCEDURE — 99213 OFFICE O/P EST LOW 20 MIN: CPT

## 2022-03-02 RX ORDER — TADALAFIL 10 MG/1
10 TABLET, FILM COATED ORAL
Qty: 6 | Refills: 11 | Status: DISCONTINUED | COMMUNITY
Start: 2022-03-02 | End: 2022-03-02

## 2022-03-02 RX ORDER — TADALAFIL 20 MG/1
20 TABLET ORAL
Qty: 6 | Refills: 11 | Status: ACTIVE | COMMUNITY
Start: 2022-03-02 | End: 1900-01-01

## 2022-03-02 RX ORDER — TAMSULOSIN HYDROCHLORIDE 0.4 MG/1
0.4 CAPSULE ORAL
Qty: 90 | Refills: 3 | Status: DISCONTINUED | COMMUNITY
Start: 2020-11-04 | End: 2022-03-02

## 2022-03-02 NOTE — ASSESSMENT
[FreeTextEntry1] : 81yo male with BPH, mild-moderate LUTS, and ED \par \par Exacerbation in urinary symptoms likely related to diet.  Reviewed dietary recommendations with patient. Advised to decrease intake of caffeine, carbonated drinks. Decrease nighttime alcohol intake. \par Cont. Alfuzosin 10 mg\par \par Regarding ED, failed on sildenafil 100 mg. \par Trial tadalafil 20 mg. Reviewed med use and side effects with patient \par Med sent to pharm on file. \par \par F/u 1 year or sooner.

## 2022-03-02 NOTE — PHYSICAL EXAM
[General Appearance - Well Developed] : well developed [General Appearance - Well Nourished] : well nourished [Normal Appearance] : normal appearance [Well Groomed] : well groomed [General Appearance - In No Acute Distress] : no acute distress [Abdomen Soft] : soft [Abdomen Tenderness] : non-tender [Costovertebral Angle Tenderness] : no ~M costovertebral angle tenderness [Edema] : no peripheral edema [] : no respiratory distress [Respiration, Rhythm And Depth] : normal respiratory rhythm and effort [Exaggerated Use Of Accessory Muscles For Inspiration] : no accessory muscle use [Normal Station and Gait] : the gait and station were normal for the patient's age [Urethral Meatus] : meatus normal [Penis Abnormality] : normal circumcised penis [Scrotum] : the scrotum was normal [Testes Mass (___cm)] : there were no testicular masses [FreeTextEntry1] : b/l inguinal hernia, R > L

## 2022-03-02 NOTE — END OF VISIT
[FreeTextEntry3] : Seen and examined with NP. BPH on alfuzosin. Worsening frequency, nocturia but likely related to fluid intake. Counseled on lifestyle modification. ED not responsive to Viagra 100mg prn. Will trial Cialis 20mg prn. F/u 1 year.

## 2022-03-02 NOTE — HISTORY OF PRESENT ILLNESS
[Nocturia] : nocturia [Post-Void Dribbling] : post-void dribbling [Erectile Dysfunction] : Erectile Dysfunction [None] : None [Urinary Urgency] : urinary urgency [Urinary Frequency] : urinary frequency [FreeTextEntry1] : 79yo male here for BPH evaluation. Noted worsening symptoms since starting Spiriva. AUASS = 11. He is , not sexually active. PSA = 7.7, no prior PSA available. No prior biopsy or MRI. \par \par 2/22/21 Here for f/u. Doing well. Started something for ED. BPH with mild symptoms.\par \par 3/2/22 Here for f/u. Reports increase in frequency, nocturia x3, urgency, straining PVD. Goes small amounts each time. Flow is strong, non-intermittent.  He does drink 3 cups of coffee and carbonated drinks daily. He also drinks alcohol at night frequently. Denies dysuria, hematuria. Denies stress incontinence, urge incontinence. \par Regarding ED, patient reports it is not improved on sildenafil 100 mg. He has difficulty attaining and maintain erections. Ejaculation are okay. \par

## 2022-10-19 RX ORDER — SODIUM CHLORIDE 9 MG/ML
1000 INJECTION, SOLUTION INTRAVENOUS
Refills: 0 | Status: DISCONTINUED | OUTPATIENT
Start: 2022-10-20 | End: 2022-10-20

## 2022-10-19 NOTE — ASU PATIENT PROFILE, ADULT - FALL HARM RISK - UNIVERSAL INTERVENTIONS
Bed in lowest position, wheels locked, appropriate side rails in place/Call bell, personal items and telephone in reach/Instruct patient to call for assistance before getting out of bed or chair/Non-slip footwear when patient is out of bed/[place] to call system/Physically safe environment - no spills, clutter or unnecessary equipment/Purposeful Proactive Rounding/Room/bathroom lighting operational, light cord in reach

## 2022-10-19 NOTE — ASU PATIENT PROFILE, ADULT - NSICDXPASTSURGICALHX_GEN_ALL_CORE_FT
PAST SURGICAL HISTORY:  H/O arthroscopy of knee bilateral    H/O total shoulder replacement, right     History of lipoma

## 2022-10-20 ENCOUNTER — TRANSCRIPTION ENCOUNTER (OUTPATIENT)
Age: 82
End: 2022-10-20

## 2022-10-20 ENCOUNTER — OUTPATIENT (OUTPATIENT)
Dept: OUTPATIENT SERVICES | Facility: HOSPITAL | Age: 82
LOS: 1 days | Discharge: HOME | End: 2022-10-20

## 2022-10-20 VITALS
HEIGHT: 65 IN | HEART RATE: 89 BPM | SYSTOLIC BLOOD PRESSURE: 121 MMHG | OXYGEN SATURATION: 97 % | WEIGHT: 156.09 LBS | DIASTOLIC BLOOD PRESSURE: 62 MMHG | TEMPERATURE: 98 F | RESPIRATION RATE: 16 BRPM

## 2022-10-20 VITALS
OXYGEN SATURATION: 95 % | TEMPERATURE: 99 F | RESPIRATION RATE: 16 BRPM | HEART RATE: 86 BPM | DIASTOLIC BLOOD PRESSURE: 62 MMHG | SYSTOLIC BLOOD PRESSURE: 108 MMHG

## 2022-10-20 DIAGNOSIS — Z98.890 OTHER SPECIFIED POSTPROCEDURAL STATES: Chronic | ICD-10-CM

## 2022-10-20 DIAGNOSIS — Z96.611 PRESENCE OF RIGHT ARTIFICIAL SHOULDER JOINT: Chronic | ICD-10-CM

## 2022-10-20 DIAGNOSIS — Z86.018 PERSONAL HISTORY OF OTHER BENIGN NEOPLASM: Chronic | ICD-10-CM

## 2022-10-20 DEVICE — IMPLANTABLE DEVICE
Type: IMPLANTABLE DEVICE | Site: RIGHT (RIGHT EYE) | Status: NON-FUNCTIONAL
Removed: 2022-10-20

## 2022-10-20 RX ORDER — ACETAMINOPHEN 500 MG
650 TABLET ORAL ONCE
Refills: 0 | Status: DISCONTINUED | OUTPATIENT
Start: 2022-10-20 | End: 2022-10-20

## 2022-10-20 RX ORDER — TROPICAMIDE 1 %
1 DROPS OPHTHALMIC (EYE)
Refills: 0 | Status: COMPLETED | OUTPATIENT
Start: 2022-10-20 | End: 2022-10-20

## 2022-10-20 RX ORDER — OFLOXACIN 0.3 %
1 DROPS OPHTHALMIC (EYE)
Refills: 0 | Status: COMPLETED | OUTPATIENT
Start: 2022-10-20 | End: 2022-10-20

## 2022-10-20 RX ORDER — ONDANSETRON 8 MG/1
4 TABLET, FILM COATED ORAL ONCE
Refills: 0 | Status: DISCONTINUED | OUTPATIENT
Start: 2022-10-20 | End: 2022-10-20

## 2022-10-20 RX ORDER — KETOROLAC TROMETHAMINE 0.5 %
1 DROPS OPHTHALMIC (EYE)
Refills: 0 | Status: COMPLETED | OUTPATIENT
Start: 2022-10-20 | End: 2022-10-20

## 2022-10-20 RX ORDER — CYCLOPENTOLATE HYDROCHLORIDE 10 MG/ML
1 SOLUTION/ DROPS OPHTHALMIC
Refills: 0 | Status: COMPLETED | OUTPATIENT
Start: 2022-10-20 | End: 2022-10-20

## 2022-10-20 RX ORDER — PHENYLEPHRINE HCL 2.5 %
1 DROPS OPHTHALMIC (EYE)
Refills: 0 | Status: COMPLETED | OUTPATIENT
Start: 2022-10-20 | End: 2022-10-20

## 2022-10-20 RX ADMIN — Medication 1 DROP(S): at 09:16

## 2022-10-20 RX ADMIN — Medication 1 DROP(S): at 09:21

## 2022-10-20 RX ADMIN — Medication 1 DROP(S): at 09:20

## 2022-10-20 RX ADMIN — Medication 1 DROP(S): at 09:10

## 2022-10-20 RX ADMIN — CYCLOPENTOLATE HYDROCHLORIDE 1 DROP(S): 10 SOLUTION/ DROPS OPHTHALMIC at 09:11

## 2022-10-20 RX ADMIN — CYCLOPENTOLATE HYDROCHLORIDE 1 DROP(S): 10 SOLUTION/ DROPS OPHTHALMIC at 09:21

## 2022-10-20 RX ADMIN — CYCLOPENTOLATE HYDROCHLORIDE 1 DROP(S): 10 SOLUTION/ DROPS OPHTHALMIC at 09:16

## 2022-10-20 NOTE — ASU PREOP CHECKLIST - BP NONINVASIVE SYSTOLIC (MM HG)
121 Same Histology In Subsequent Stages Text: The pattern and morphology of the tumor is as described in the first stage.

## 2022-10-20 NOTE — ASU DISCHARGE PLAN (ADULT/PEDIATRIC) - NS MD DC FALL RISK RISK
For information on Fall & Injury Prevention, visit: [email or web address] OR  [email or web address] OR  https://www.cdc.gov/steadi/patient.html

## 2022-10-21 NOTE — OPERATIVE REPORT - OPERATIVE RPOSRT DETAILS
PROCEDURE DATE: 10-20-22    OPERATION:  Phacoemulsification with lens implant, RIGHT eye, plus femtolaser plus ORA, RIGHT eye.    PREOPERATIVE DIAGNOSES:  Nuclear sclerotic cataract, astigmatism- right eye    POSTOPERATIVE DIAGNOSES:  Nuclear sclerotic cataract, astigmatism - right eye    PROCEDURE:  After appropriate medical clearance and informed consent was obtained, the patient was brought into the operating room in stable condition.  Prior to placing the patient at the Femto laser, topical anesthetic was placed into the right eye and while the patient was sitting up, a marking pen was used to measure the 3, 6, 9 and 12 o'clock positions of the eye.  The patient was in the prone position for Femto laser.  Suction cup was placed on the eye and docked to the laser.  Capsulorhexis and nuclear fragmentation was performed.  Suction was removed and the Femto procedure was finished.  After this, the patient was brought into the operating room where MAC anesthesia was induced and the patient was prepped and draped in the usual manner for sterile ophthalmic surgery.  A Ben speculum was placed into the eye and stabilized with two 4x4s.  Topical lidocaine 4% was instilled over the cornea.  The surgeon sat temporally.  At this point, the Toric lens desired axis of positioning was then marked using a marking pen.  A paracentesis was then made and intraocular lidocaine and viscoelastic was inserted into the eye.  Then a biplanar incision was used with a 2.4 mm keratome to make an incision at approximately 3 o'clock to the left eye and approximately 9 o'clock for the right eye.  The anterior capsule was removed and hydrodissection and hydrodelineation of the lens was then performed using BSS on a flat-tipped cannula.  Phacoemulsification of the nucleus was then performed by sculpting the nucleus into half and removing each quadrant after visco was injected.  Phacoemulsifications of the two-halves was performed without complication.    Any remaining cortical material was removed using irrigation and aspiration mechanical technique.  Both the anterior and posterior leaflets of the capsule were cleaned and Healon was inserted in the bag to inflate the anterior and posterior leaflets.  At this time, an ORA was performed to ensure that the desired axis and power of implant was correct.  The Toric lens was then inserted into the right eye and dilated into position at the correct axis.  A Barraquer tonometer was then used to determine that intraocular pressure of the eye was somewhere between 15 to 20 mmHg.  The ORA machine was then used to determine the correct intraocular lens implant, which was confirmed at 14.5 diopters for an [id number] lens.  The ORA machine was turned off and the Implant number [id number], 14.5 diopter was inserted into the bag and rotated into position.  Any remaining Healon was then removed with careful attention to ensure that there was no rotation of the implant and that it remained on axis to correct the patient's astigmatism.    Miochol was then inserted into the eye until good pressure was obtained.  Hydration of both the paracenteses in the wounds were made using BSS and after careful attention to ensuring that the wound closure was made.  At this point, the Ben speculum was removed and a clear patch was placed over the eye.  The patient returned to the recovery room in stable and improved condition.

## 2022-10-27 PROBLEM — D86.9 SARCOIDOSIS, UNSPECIFIED: Chronic | Status: ACTIVE | Noted: 2022-10-19

## 2022-10-27 PROBLEM — Z87.39 PERSONAL HISTORY OF OTHER DISEASES OF THE MUSCULOSKELETAL SYSTEM AND CONNECTIVE TISSUE: Chronic | Status: ACTIVE | Noted: 2022-10-20

## 2022-10-27 PROBLEM — I10 ESSENTIAL (PRIMARY) HYPERTENSION: Chronic | Status: ACTIVE | Noted: 2022-10-19

## 2022-10-27 PROBLEM — M19.90 UNSPECIFIED OSTEOARTHRITIS, UNSPECIFIED SITE: Chronic | Status: ACTIVE | Noted: 2022-10-20

## 2022-10-27 PROBLEM — N40.0 BENIGN PROSTATIC HYPERPLASIA WITHOUT LOWER URINARY TRACT SYMPTOMS: Chronic | Status: ACTIVE | Noted: 2022-10-20

## 2022-10-27 PROBLEM — Z87.09 PERSONAL HISTORY OF OTHER DISEASES OF THE RESPIRATORY SYSTEM: Chronic | Status: ACTIVE | Noted: 2022-10-19

## 2022-11-02 RX ORDER — SODIUM CHLORIDE 9 MG/ML
1000 INJECTION, SOLUTION INTRAVENOUS
Refills: 0 | Status: DISCONTINUED | OUTPATIENT
Start: 2022-11-03 | End: 2022-11-03

## 2022-11-02 NOTE — ASU PATIENT PROFILE, ADULT - ABILITY TO HEAR (WITH HEARING AID OR HEARING APPLIANCE IF NORMALLY USED):
14-May-2019 21:15 Mildly to Moderately Impaired: difficulty hearing in some environments or speaker may need to increase volume or speak distinctly

## 2022-11-02 NOTE — ASU PATIENT PROFILE, ADULT - NSICDXPASTSURGICALHX_GEN_ALL_CORE_FT
PAST SURGICAL HISTORY:  H/O arthroscopy of knee bilateral    H/O right cataract extraction     H/O total shoulder replacement, right     History of lipoma

## 2022-11-02 NOTE — ASU PATIENT PROFILE, ADULT - NSICDXPASTMEDICALHX_GEN_ALL_CORE_FT
PAST MEDICAL HISTORY:  Arthritis     BPH (benign prostatic hyperplasia)     Emphysema of lung     H/O emphysema     H/O: gout     Hypertension     Sarcoidosis

## 2022-11-03 ENCOUNTER — OUTPATIENT (OUTPATIENT)
Dept: OUTPATIENT SERVICES | Facility: HOSPITAL | Age: 82
LOS: 1 days | Discharge: HOME | End: 2022-11-03

## 2022-11-03 ENCOUNTER — TRANSCRIPTION ENCOUNTER (OUTPATIENT)
Age: 82
End: 2022-11-03

## 2022-11-03 VITALS
HEART RATE: 79 BPM | RESPIRATION RATE: 14 BRPM | TEMPERATURE: 98 F | WEIGHT: 152.12 LBS | DIASTOLIC BLOOD PRESSURE: 70 MMHG | HEIGHT: 65 IN | OXYGEN SATURATION: 97 % | SYSTOLIC BLOOD PRESSURE: 128 MMHG

## 2022-11-03 VITALS
RESPIRATION RATE: 16 BRPM | DIASTOLIC BLOOD PRESSURE: 53 MMHG | HEART RATE: 79 BPM | OXYGEN SATURATION: 95 % | TEMPERATURE: 98 F | SYSTOLIC BLOOD PRESSURE: 113 MMHG

## 2022-11-03 DIAGNOSIS — Z98.41 CATARACT EXTRACTION STATUS, RIGHT EYE: Chronic | ICD-10-CM

## 2022-11-03 DIAGNOSIS — Z86.018 PERSONAL HISTORY OF OTHER BENIGN NEOPLASM: Chronic | ICD-10-CM

## 2022-11-03 DIAGNOSIS — Z96.611 PRESENCE OF RIGHT ARTIFICIAL SHOULDER JOINT: Chronic | ICD-10-CM

## 2022-11-03 DIAGNOSIS — Z98.890 OTHER SPECIFIED POSTPROCEDURAL STATES: Chronic | ICD-10-CM

## 2022-11-03 DEVICE — IMPLANTABLE DEVICE
Type: IMPLANTABLE DEVICE | Site: LEFT | Status: NON-FUNCTIONAL
Removed: 2022-11-03

## 2022-11-03 RX ORDER — BECLOMETHASONE DIPROPIONATE 40 UG/1
1 AEROSOL, METERED RESPIRATORY (INHALATION)
Qty: 0 | Refills: 0 | DISCHARGE

## 2022-11-03 RX ORDER — CYCLOPENTOLATE HYDROCHLORIDE 10 MG/ML
1 SOLUTION/ DROPS OPHTHALMIC
Refills: 0 | Status: COMPLETED | OUTPATIENT
Start: 2022-11-03 | End: 2022-11-03

## 2022-11-03 RX ORDER — ROSUVASTATIN CALCIUM 5 MG/1
1 TABLET ORAL
Qty: 0 | Refills: 0 | DISCHARGE

## 2022-11-03 RX ORDER — KETOROLAC TROMETHAMINE 0.5 %
1 DROPS OPHTHALMIC (EYE)
Refills: 0 | Status: COMPLETED | OUTPATIENT
Start: 2022-11-03 | End: 2022-11-03

## 2022-11-03 RX ORDER — ACETAMINOPHEN 500 MG
650 TABLET ORAL ONCE
Refills: 0 | Status: DISCONTINUED | OUTPATIENT
Start: 2022-11-03 | End: 2022-11-03

## 2022-11-03 RX ORDER — ONDANSETRON 8 MG/1
4 TABLET, FILM COATED ORAL ONCE
Refills: 0 | Status: DISCONTINUED | OUTPATIENT
Start: 2022-11-03 | End: 2022-11-03

## 2022-11-03 RX ORDER — LOSARTAN POTASSIUM 100 MG/1
1 TABLET, FILM COATED ORAL
Qty: 0 | Refills: 0 | DISCHARGE

## 2022-11-03 RX ORDER — SERTRALINE 25 MG/1
1 TABLET, FILM COATED ORAL
Qty: 0 | Refills: 0 | DISCHARGE

## 2022-11-03 RX ORDER — TROPICAMIDE 1 %
1 DROPS OPHTHALMIC (EYE)
Refills: 0 | Status: COMPLETED | OUTPATIENT
Start: 2022-11-03 | End: 2022-11-03

## 2022-11-03 RX ORDER — OLODATEROL RESPIMAT INHALATION SPRAY 2.5 UG/1
2 SPRAY, METERED RESPIRATORY (INHALATION)
Qty: 0 | Refills: 0 | DISCHARGE

## 2022-11-03 RX ORDER — PHENYLEPHRINE HCL 2.5 %
1 DROPS OPHTHALMIC (EYE)
Refills: 0 | Status: COMPLETED | OUTPATIENT
Start: 2022-11-03 | End: 2022-11-03

## 2022-11-03 RX ORDER — ALLOPURINOL 300 MG
1 TABLET ORAL
Qty: 0 | Refills: 0 | DISCHARGE

## 2022-11-03 RX ORDER — OFLOXACIN 0.3 %
1 DROPS OPHTHALMIC (EYE)
Refills: 0 | Status: COMPLETED | OUTPATIENT
Start: 2022-11-03 | End: 2022-11-03

## 2022-11-03 RX ORDER — ALFUZOSIN HYDROCHLORIDE 10 MG/1
1 TABLET, EXTENDED RELEASE ORAL
Qty: 0 | Refills: 0 | DISCHARGE

## 2022-11-03 RX ORDER — AMLODIPINE BESYLATE 2.5 MG/1
1 TABLET ORAL
Qty: 0 | Refills: 0 | DISCHARGE

## 2022-11-03 RX ADMIN — Medication 1 DROP(S): at 08:31

## 2022-11-03 RX ADMIN — Medication 1 DROP(S): at 08:39

## 2022-11-03 RX ADMIN — CYCLOPENTOLATE HYDROCHLORIDE 1 DROP(S): 10 SOLUTION/ DROPS OPHTHALMIC at 08:39

## 2022-11-03 RX ADMIN — Medication 1 DROP(S): at 08:38

## 2022-11-03 RX ADMIN — Medication 1 DROP(S): at 08:25

## 2022-11-03 RX ADMIN — Medication 1 DROP(S): at 08:32

## 2022-11-03 RX ADMIN — CYCLOPENTOLATE HYDROCHLORIDE 1 DROP(S): 10 SOLUTION/ DROPS OPHTHALMIC at 08:25

## 2022-11-03 RX ADMIN — CYCLOPENTOLATE HYDROCHLORIDE 1 DROP(S): 10 SOLUTION/ DROPS OPHTHALMIC at 08:32

## 2022-11-03 NOTE — OPERATIVE REPORT - OPERATIVE RPOSRT DETAILS
PROCEDURE DATE: 11-03-22    OPERATION:  Phacoemulsification with lens implant, LEFT eye, plus femtolaser plus ORA Dayana.    PREOPERATIVE DIAGNOSES:  Nuclear sclerotic cataract- LEFT eye    POSTOPERATIVE DIAGNOSES:  Nuclear sclerotic cataract - LEFT eye    DESCRIPTION OF PROCEDURE:  After appropriate medical clearance and informed consent was obtained, the patient was brought into the operating room in stable condition.  The patient was in the prone position for femtolaser to the left eye.  Section cup placed on the left eye and docked to the laser.  Capsulorrhexis and nuclear fragmentation was performed.  Section was removed and femto procedure was finished.  After this, the patient was brought into the operating room where MAC anesthesia was induced and the patient was prepped and draped in the usual manner for sterile ophthalmic surgery.  A Ben speculum was placed into the eye and stabilized with two 4x4s.  Topical lidocaine 4% and Ocucoat viscoelastic was instilled over the cornea.  The surgeon sat temporally.  A paracentesis was made and intraocular lidocaine 1% was instilled into the anterior chamber.  Healon GV was then placed into the anterior chamber.    An anterior rhexis capsulotomy was then performed without complication.  Hydrodissection and hydrodelineation of the lens was then performed using BSS on a flat-tip cannula.  Phacoemulsification of the nucleus was then performed by sculpting the nucleus in half.  Healon GV was then placed into the bag and the cracker was used to split the nucleus in half.  Viscoat was then placed into the crack to enlarge the crack and to shield the endothelium.  Phacoemulsification of the 2 halves was then performed without complication.    Mechanical irrigation and aspiration was then performed to remove any remaining cortical material.  Polishing of the anterior and posterior capsules was then performed.  The bag was then inflated using Healon to separate the anterior and posterior leaflets.  A Barraquer tonometer was then used to determine that intraocular pressure of the eye was somewhere between 15 to 20 mmHg.  The ORA machine was then used to determine the correct intraocular lens implant, which was confirmed at 11.0 diopters for an ZLBOO lens.  The ORA machine was turned off and the Implant number [id number], 11.0 diopter was inserted into the bag and rotated into position.  Irrigation and aspiration of the remaining viscoelastic material was then performed with gentle tapping of the lens to ensure that no viscoelastic material was caught behind the lens.  Miochol was inserted into the eye after inserting into the eye and a round pupil was noted at the end of the case.    Careful attention to ensure that there was no wound leak was performed.  A speculum was removed.  A drop each of TobraDex and Timoptic XE was placed into the eye.  A clear shield was then taped over the eye.  The patient returned to the recovery room in stable and improved condition.

## 2023-02-06 PROBLEM — J43.9 EMPHYSEMA, UNSPECIFIED: Chronic | Status: ACTIVE | Noted: 2022-11-02

## 2023-02-09 ENCOUNTER — APPOINTMENT (OUTPATIENT)
Dept: PHARMACY | Facility: CLINIC | Age: 83
End: 2023-02-09
Payer: SELF-PAY

## 2023-02-09 ENCOUNTER — APPOINTMENT (OUTPATIENT)
Dept: OTOLARYNGOLOGY | Facility: CLINIC | Age: 83
End: 2023-02-09
Payer: MEDICARE

## 2023-02-09 VITALS
TEMPERATURE: 98 F | BODY MASS INDEX: 24.43 KG/M2 | HEART RATE: 82 BPM | OXYGEN SATURATION: 95 % | SYSTOLIC BLOOD PRESSURE: 128 MMHG | HEIGHT: 66 IN | DIASTOLIC BLOOD PRESSURE: 67 MMHG | WEIGHT: 152 LBS

## 2023-02-09 DIAGNOSIS — H90.3 SENSORINEURAL HEARING LOSS, BILATERAL: ICD-10-CM

## 2023-02-09 DIAGNOSIS — H93.13 TINNITUS, BILATERAL: ICD-10-CM

## 2023-02-09 DIAGNOSIS — H61.23 IMPACTED CERUMEN, BILATERAL: ICD-10-CM

## 2023-02-09 PROCEDURE — 92557 COMPREHENSIVE HEARING TEST: CPT

## 2023-02-09 PROCEDURE — V5299A: CUSTOM | Mod: NC

## 2023-02-09 PROCEDURE — 92550 TYMPANOMETRY & REFLEX THRESH: CPT | Mod: 52

## 2023-02-09 PROCEDURE — 99203 OFFICE O/P NEW LOW 30 MIN: CPT | Mod: 25

## 2023-02-09 PROCEDURE — G0268 REMOVAL OF IMPACTED WAX MD: CPT

## 2023-02-09 NOTE — HISTORY OF PRESENT ILLNESS
[de-identified] : 83 y/o M with h/o b snhl and tinnitus I have not seen in 3 yrs. He wears b hearing aids. He feels since last visit his hearing loss has progressed b. He is having trouble hearing in larger crowds. He also feels tinnitus has worsened. it is continuous and high pitched. Denies pain or drainage. Denies cotton swab use. No fh or sh relevant to cc.

## 2023-02-09 NOTE — ASSESSMENT
[FreeTextEntry1] : 1. b cerumen impaction \par -cerumen removed\par -ears felt better\par 2. b snhl, b tinnitus \par - showed b snhl- 5-10 dB decrease when compared with 19 -results reviewed with pt \par -continue with ha- he is having ha adjustment \par -reassured/explained tinnitus is from hearing loss\par -for tinnitus recommended increasing background noise/ trying a white noise machine, HAE with maskers, trt- he prefers to hold off\par RTC in 1 yr with rpt  or sooner as needed\par \par

## 2023-02-09 NOTE — PHYSICAL EXAM
[de-identified] : b copious cerumen removed atraumatically with suction, b TMs nl  [Normal] : no nystagmus [de-identified] : gait steady

## 2023-02-09 NOTE — DATA REVIEWED
[de-identified] :  showed b snhl- 5-10 dB decrease when compared with 19 -results reviewed with pt

## 2023-02-27 ENCOUNTER — APPOINTMENT (OUTPATIENT)
Dept: UROLOGY | Facility: CLINIC | Age: 83
End: 2023-02-27
Payer: MEDICARE

## 2023-02-27 VITALS — DIASTOLIC BLOOD PRESSURE: 77 MMHG | HEART RATE: 90 BPM | SYSTOLIC BLOOD PRESSURE: 127 MMHG | TEMPERATURE: 97 F

## 2023-02-27 DIAGNOSIS — N52.9 MALE ERECTILE DYSFUNCTION, UNSPECIFIED: ICD-10-CM

## 2023-02-27 DIAGNOSIS — N40.1 BENIGN PROSTATIC HYPERPLASIA WITH LOWER URINARY TRACT SYMPMS: ICD-10-CM

## 2023-02-27 DIAGNOSIS — N13.8 BENIGN PROSTATIC HYPERPLASIA WITH LOWER URINARY TRACT SYMPMS: ICD-10-CM

## 2023-02-27 PROCEDURE — 99213 OFFICE O/P EST LOW 20 MIN: CPT | Mod: 25

## 2023-02-27 PROCEDURE — 51798 US URINE CAPACITY MEASURE: CPT

## 2023-02-27 NOTE — HISTORY OF PRESENT ILLNESS
[Urinary Urgency] : urinary urgency [Urinary Frequency] : urinary frequency [Nocturia] : nocturia [Post-Void Dribbling] : post-void dribbling [Erectile Dysfunction] : Erectile Dysfunction [None] : None [FreeTextEntry1] : 81yo male here for BPH evaluation. Noted worsening symptoms since starting Spiriva. AUASS = 11. He is , not sexually active. PSA = 7.7, no prior PSA available. No prior biopsy or MRI. \par \par 2/22/21 Here for f/u. Doing well. Started something for ED. BPH with mild symptoms.\par \par 3/2/22 Here for f/u. Reports increase in frequency, nocturia x3, urgency, straining PVD. Goes small amounts each time. Flow is strong, non-intermittent.  He does drink 3 cups of coffee and carbonated drinks daily. He also drinks alcohol at night frequently. Denies dysuria, hematuria. Denies stress incontinence, urge incontinence. \par Regarding ED, patient reports it is not improved on sildenafil 100 mg. He has difficulty attaining and maintain erections. Ejaculation are okay. \par \par 2/27/23 Here for follow up. No sig changes in voiding symptoms since last visit. He cut back on coffee slightly, but no changes to nightly alcohol intake. Denies dysuria, hematuria. \par Regarding ED, He is satisfied with tadalafil now.

## 2023-02-27 NOTE — LETTER BODY
[Dear  ___] : Dear  [unfilled], [Courtesy Letter:] : I had the pleasure of seeing your patient, [unfilled], in my office today. [Please see my note below.] : Please see my note below. [Consult Closing:] : Thank you very much for allowing me to participate in the care of this patient.  If you have any questions, please do not hesitate to contact me. [Sincerely,] : Sincerely, [FreeTextEntry2] : Omar Barrera MD\par [address]\[place] [address] \par  [FreeTextEntry3] : Qasim Kim MD, FACS\par Urologic Oncology\par Department of Urology\par [facility]\par \par [employer] [facility] \par \par

## 2023-02-27 NOTE — END OF VISIT
[FreeTextEntry3] : I, Dr. Kim, personally performed the evaluation and management (E/M) services for this established patient who presents today with (a) new problem(s)/exacerbation of (an) existing condition(s).  That E/M includes conducting the examination, assessing all new/exacerbated conditions, and establishing a new plan of care.  Today, our ACP, Cathy Santos, was here to observe the evaluation and management services for this new problem/exacerbated condition to be followed going forward.\par

## 2023-02-27 NOTE — ASSESSMENT
[FreeTextEntry1] : 83yo male with BPH, mild-moderate LUTS, and ED \par PVR today = 36cc\par Urinary symptoms likely related to diet, fluid intake.  \par Reviewed dietary recommendations with patient.\par Advised to decrease intake of caffeine, carbonated drinks. Decrease nighttime alcohol intake. \par Cont. Alfuzosin 10 mg\par \par Regarding ED, cont  tadalafil 20 mg.\par \par F/u 1 year or sooner.

## 2023-02-27 NOTE — PHYSICAL EXAM
[General Appearance - Well Developed] : well developed [General Appearance - Well Nourished] : well nourished [Normal Appearance] : normal appearance [Well Groomed] : well groomed [General Appearance - In No Acute Distress] : no acute distress [Edema] : no peripheral edema [] : no respiratory distress [Respiration, Rhythm And Depth] : normal respiratory rhythm and effort [Exaggerated Use Of Accessory Muscles For Inspiration] : no accessory muscle use [Normal Station and Gait] : the gait and station were normal for the patient's age [FreeTextEntry1] : p

## 2023-11-07 ENCOUNTER — APPOINTMENT (OUTPATIENT)
Dept: PHARMACY | Facility: CLINIC | Age: 83
End: 2023-11-07
Payer: SELF-PAY

## 2023-11-07 PROCEDURE — V5299A: CUSTOM

## 2024-02-07 ENCOUNTER — APPOINTMENT (OUTPATIENT)
Dept: OTOLARYNGOLOGY | Facility: CLINIC | Age: 84
End: 2024-02-07
Payer: SELF-PAY

## 2024-02-07 PROCEDURE — V5299A: CUSTOM

## 2024-02-26 ENCOUNTER — APPOINTMENT (OUTPATIENT)
Dept: UROLOGY | Facility: CLINIC | Age: 84
End: 2024-02-26

## 2024-08-27 ENCOUNTER — APPOINTMENT (OUTPATIENT)
Dept: PHARMACY | Facility: CLINIC | Age: 84
End: 2024-08-27
Payer: SELF-PAY

## 2024-08-27 PROCEDURE — V5267M: CUSTOM

## 2024-08-27 PROCEDURE — V5014F: CUSTOM

## 2024-09-04 ENCOUNTER — APPOINTMENT (OUTPATIENT)
Dept: PHARMACY | Facility: CLINIC | Age: 84
End: 2024-09-04

## 2024-09-06 ENCOUNTER — APPOINTMENT (OUTPATIENT)
Dept: PHARMACY | Facility: CLINIC | Age: 84
End: 2024-09-06
Payer: SELF-PAY

## 2024-09-06 PROCEDURE — V5299A: CUSTOM

## 2025-02-11 ENCOUNTER — APPOINTMENT (OUTPATIENT)
Dept: OTOLARYNGOLOGY | Facility: CLINIC | Age: 85
End: 2025-02-11
Payer: MEDICARE

## 2025-02-11 ENCOUNTER — APPOINTMENT (OUTPATIENT)
Dept: OTOLARYNGOLOGY | Facility: CLINIC | Age: 85
End: 2025-02-11
Payer: SELF-PAY

## 2025-02-11 PROCEDURE — 92550 TYMPANOMETRY & REFLEX THRESH: CPT | Mod: 52

## 2025-02-11 PROCEDURE — 92557 COMPREHENSIVE HEARING TEST: CPT

## 2025-02-11 PROCEDURE — V5010 ASSESSMENT FOR HEARING AID: CPT | Mod: NC

## 2025-02-19 ENCOUNTER — APPOINTMENT (OUTPATIENT)
Dept: UROLOGY | Facility: CLINIC | Age: 85
End: 2025-02-19

## 2025-02-21 ENCOUNTER — NON-APPOINTMENT (OUTPATIENT)
Age: 85
End: 2025-02-21

## 2025-02-27 ENCOUNTER — APPOINTMENT (OUTPATIENT)
Dept: PHARMACY | Facility: CLINIC | Age: 85
End: 2025-02-27
Payer: SELF-PAY

## 2025-02-27 PROCEDURE — V5299A: CUSTOM

## 2025-03-06 ENCOUNTER — APPOINTMENT (OUTPATIENT)
Dept: PHARMACY | Facility: CLINIC | Age: 85
End: 2025-03-06
Payer: SELF-PAY

## 2025-03-06 PROCEDURE — V5261F: CUSTOM

## 2025-03-17 ENCOUNTER — APPOINTMENT (OUTPATIENT)
Dept: PHARMACY | Facility: CLINIC | Age: 85
End: 2025-03-17
Payer: SELF-PAY

## 2025-03-17 PROCEDURE — V5299A: CUSTOM

## 2025-04-03 ENCOUNTER — NON-APPOINTMENT (OUTPATIENT)
Age: 85
End: 2025-04-03

## 2025-04-03 ENCOUNTER — APPOINTMENT (OUTPATIENT)
Dept: PHARMACY | Facility: CLINIC | Age: 85
End: 2025-04-03
Payer: SELF-PAY

## 2025-04-03 ENCOUNTER — APPOINTMENT (OUTPATIENT)
Dept: OTOLARYNGOLOGY | Facility: CLINIC | Age: 85
End: 2025-04-03
Payer: MEDICARE

## 2025-04-03 VITALS
DIASTOLIC BLOOD PRESSURE: 73 MMHG | HEIGHT: 66 IN | TEMPERATURE: 98 F | WEIGHT: 152 LBS | OXYGEN SATURATION: 98 % | BODY MASS INDEX: 24.43 KG/M2 | SYSTOLIC BLOOD PRESSURE: 159 MMHG | HEART RATE: 87 BPM

## 2025-04-03 DIAGNOSIS — H61.23 IMPACTED CERUMEN, BILATERAL: ICD-10-CM

## 2025-04-03 PROCEDURE — V5299A: CUSTOM

## 2025-04-03 PROCEDURE — 99212 OFFICE O/P EST SF 10 MIN: CPT

## 2025-04-21 ENCOUNTER — RESULT REVIEW (OUTPATIENT)
Age: 85
End: 2025-04-21

## 2025-04-23 ENCOUNTER — APPOINTMENT (OUTPATIENT)
Dept: CT IMAGING | Facility: HOSPITAL | Age: 85
End: 2025-04-23

## 2025-04-23 ENCOUNTER — OUTPATIENT (OUTPATIENT)
Dept: OUTPATIENT SERVICES | Facility: HOSPITAL | Age: 85
LOS: 1 days | End: 2025-04-23
Payer: MEDICARE

## 2025-04-23 ENCOUNTER — APPOINTMENT (OUTPATIENT)
Dept: SURGICAL ONCOLOGY | Facility: CLINIC | Age: 85
End: 2025-04-23
Payer: MEDICARE

## 2025-04-23 VITALS
SYSTOLIC BLOOD PRESSURE: 144 MMHG | HEART RATE: 84 BPM | WEIGHT: 143.5 LBS | RESPIRATION RATE: 16 BRPM | HEIGHT: 65 IN | BODY MASS INDEX: 23.91 KG/M2 | TEMPERATURE: 98 F | DIASTOLIC BLOOD PRESSURE: 74 MMHG | OXYGEN SATURATION: 99 %

## 2025-04-23 DIAGNOSIS — J84.9 INTERSTITIAL PULMONARY DISEASE, UNSPECIFIED: ICD-10-CM

## 2025-04-23 DIAGNOSIS — Z86.018 PERSONAL HISTORY OF OTHER BENIGN NEOPLASM: Chronic | ICD-10-CM

## 2025-04-23 DIAGNOSIS — Z96.611 PRESENCE OF RIGHT ARTIFICIAL SHOULDER JOINT: Chronic | ICD-10-CM

## 2025-04-23 DIAGNOSIS — Z98.41 CATARACT EXTRACTION STATUS, RIGHT EYE: Chronic | ICD-10-CM

## 2025-04-23 DIAGNOSIS — C18.9 MALIGNANT NEOPLASM OF COLON, UNSPECIFIED: ICD-10-CM

## 2025-04-23 DIAGNOSIS — Z86.2 PERSONAL HISTORY OF DISEASES OF THE BLOOD AND BLOOD-FORMING ORGANS AND CERTAIN DISORDERS INVOLVING THE IMMUNE MECHANISM: ICD-10-CM

## 2025-04-23 DIAGNOSIS — Z98.890 OTHER SPECIFIED POSTPROCEDURAL STATES: Chronic | ICD-10-CM

## 2025-04-23 PROCEDURE — 71260 CT THORAX DX C+: CPT | Mod: 26

## 2025-04-23 PROCEDURE — 74177 CT ABD & PELVIS W/CONTRAST: CPT | Mod: 26

## 2025-04-23 PROCEDURE — 82565 ASSAY OF CREATININE: CPT

## 2025-04-23 PROCEDURE — 74177 CT ABD & PELVIS W/CONTRAST: CPT

## 2025-04-23 PROCEDURE — 71260 CT THORAX DX C+: CPT

## 2025-04-23 PROCEDURE — 99204 OFFICE O/P NEW MOD 45 MIN: CPT

## 2025-04-23 RX ORDER — FAMOTIDINE 40 MG/1
40 TABLET, FILM COATED ORAL
Refills: 0 | Status: ACTIVE | COMMUNITY

## 2025-04-23 RX ORDER — LOSARTAN POTASSIUM 50 MG/1
50 TABLET, FILM COATED ORAL
Refills: 0 | Status: ACTIVE | COMMUNITY

## 2025-04-23 RX ORDER — PANTOPRAZOLE 40 MG/1
40 TABLET, DELAYED RELEASE ORAL
Refills: 0 | Status: ACTIVE | COMMUNITY

## 2025-04-23 RX ORDER — IRON/IRON ASP GLY/FA/MV-MIN 38 125-25-1MG
TABLET ORAL
Refills: 0 | Status: ACTIVE | COMMUNITY

## 2025-04-23 RX ORDER — PERFLUOROHEXYLOCTANE 1 MG/MG
1.34 SOLUTION OPHTHALMIC
Refills: 0 | Status: ACTIVE | COMMUNITY

## 2025-04-23 RX ORDER — ROSUVASTATIN CALCIUM 5 MG/1
5 TABLET, FILM COATED ORAL
Refills: 0 | Status: ACTIVE | COMMUNITY

## 2025-04-23 RX ORDER — SILDENAFIL 100 MG/1
100 TABLET, FILM COATED ORAL
Refills: 0 | Status: ACTIVE | COMMUNITY

## 2025-04-28 RX ORDER — METRONIDAZOLE 500 MG/1
500 TABLET ORAL
Qty: 6 | Refills: 0 | Status: ACTIVE | COMMUNITY
Start: 2025-04-28 | End: 1900-01-01

## 2025-04-28 RX ORDER — NEOMYCIN SULFATE 500 MG/1
500 TABLET ORAL
Qty: 6 | Refills: 0 | Status: ACTIVE | COMMUNITY
Start: 2025-04-28 | End: 1900-01-01

## 2025-04-28 RX ORDER — POLYETHYLENE GLYCOL 3350 AND ELECTROLYTES WITH LEMON FLAVOR 236; 22.74; 6.74; 5.86; 2.97 G/4L; G/4L; G/4L; G/4L; G/4L
236 POWDER, FOR SOLUTION ORAL
Qty: 1 | Refills: 0 | Status: ACTIVE | COMMUNITY
Start: 2025-04-28 | End: 1900-01-01

## 2025-04-30 ENCOUNTER — APPOINTMENT (OUTPATIENT)
Dept: PHARMACY | Facility: CLINIC | Age: 85
End: 2025-04-30
Payer: SELF-PAY

## 2025-04-30 PROCEDURE — V5299A: CUSTOM

## 2025-05-05 ENCOUNTER — TRANSCRIPTION ENCOUNTER (OUTPATIENT)
Age: 85
End: 2025-05-05

## 2025-05-06 ENCOUNTER — APPOINTMENT (OUTPATIENT)
Dept: PHARMACY | Facility: CLINIC | Age: 85
End: 2025-05-06
Payer: SELF-PAY

## 2025-05-06 PROCEDURE — V5299A: CUSTOM

## 2025-05-06 NOTE — ASU PATIENT PROFILE, ADULT - NSICDXPASTSURGICALHX_GEN_ALL_CORE_FT
PAST SURGICAL HISTORY:  H/O arthroscopy of knee bilateral    H/O right cataract extraction     H/O total shoulder replacement, right     History of lipoma      PAST SURGICAL HISTORY:  H/O arthroscopy of knee bilateral    H/O carpal tunnel syndrome     H/O right cataract extraction Patient reports B/L eyes    H/O total shoulder replacement, right     History of lipoma

## 2025-05-06 NOTE — ASU PATIENT PROFILE, ADULT - NS TRANSFER PATIENT BELONGINGS
clothing stay with us//watch and hearing aids given to son Enrique/Wrist Watch/Other belongings/Clothing

## 2025-05-06 NOTE — ASU PATIENT PROFILE, ADULT - NSICDXPASTMEDICALHX_GEN_ALL_CORE_FT
PAST MEDICAL HISTORY:  Arthritis     BPH (benign prostatic hyperplasia)     Emphysema of lung     H/O emphysema     H/O: gout     Hypertension     Sarcoidosis      PAST MEDICAL HISTORY:  Arthritis     BPH (benign prostatic hyperplasia)     Emphysema of lung     GERD (gastroesophageal reflux disease)     H/O emphysema     H/O: gout     Hypertension      PAST MEDICAL HISTORY:  Arthritis     BPH (benign prostatic hyperplasia) "PAE" (Prostate seed procedure per patient)    Emphysema of lung     GERD (gastroesophageal reflux disease)     H/O emphysema     H/O: gout     Hypertension

## 2025-05-06 NOTE — ASU PATIENT PROFILE, ADULT - FALL HARM RISK - UNIVERSAL INTERVENTIONS
Bed in lowest position, wheels locked, appropriate side rails in place/Call bell, personal items and telephone in reach/Instruct patient to call for assistance before getting out of bed or chair/Non-slip footwear when patient is out of bed/Cord to call system/Physically safe environment - no spills, clutter or unnecessary equipment/Purposeful Proactive Rounding/Room/bathroom lighting operational, light cord in reach

## 2025-05-07 ENCOUNTER — TRANSCRIPTION ENCOUNTER (OUTPATIENT)
Age: 85
End: 2025-05-07

## 2025-05-07 ENCOUNTER — INPATIENT (INPATIENT)
Facility: HOSPITAL | Age: 85
LOS: 2 days | Discharge: HOME | DRG: 330 | End: 2025-05-10
Attending: SURGERY | Admitting: SURGERY
Payer: MEDICARE

## 2025-05-07 ENCOUNTER — RESULT REVIEW (OUTPATIENT)
Age: 85
End: 2025-05-07

## 2025-05-07 ENCOUNTER — APPOINTMENT (OUTPATIENT)
Dept: SURGICAL ONCOLOGY | Facility: HOSPITAL | Age: 85
End: 2025-05-07

## 2025-05-07 VITALS
SYSTOLIC BLOOD PRESSURE: 124 MMHG | HEIGHT: 65 IN | TEMPERATURE: 98 F | WEIGHT: 146.17 LBS | DIASTOLIC BLOOD PRESSURE: 67 MMHG | OXYGEN SATURATION: 96 % | HEART RATE: 72 BPM | RESPIRATION RATE: 19 BRPM

## 2025-05-07 DIAGNOSIS — Z96.611 PRESENCE OF RIGHT ARTIFICIAL SHOULDER JOINT: Chronic | ICD-10-CM

## 2025-05-07 DIAGNOSIS — Z86.018 PERSONAL HISTORY OF OTHER BENIGN NEOPLASM: Chronic | ICD-10-CM

## 2025-05-07 DIAGNOSIS — Z86.69 PERSONAL HISTORY OF OTHER DISEASES OF THE NERVOUS SYSTEM AND SENSE ORGANS: Chronic | ICD-10-CM

## 2025-05-07 DIAGNOSIS — Z98.41 CATARACT EXTRACTION STATUS, RIGHT EYE: Chronic | ICD-10-CM

## 2025-05-07 DIAGNOSIS — Z98.890 OTHER SPECIFIED POSTPROCEDURAL STATES: Chronic | ICD-10-CM

## 2025-05-07 PROBLEM — I26.99 OTHER PULMONARY EMBOLISM WITHOUT ACUTE COR PULMONALE: Chronic | Status: INACTIVE | Noted: 2025-05-06 | Resolved: 2025-05-07

## 2025-05-07 PROBLEM — D86.9 SARCOIDOSIS, UNSPECIFIED: Chronic | Status: INACTIVE | Noted: 2022-10-19 | Resolved: 2025-05-07

## 2025-05-07 PROBLEM — K21.9 GASTRO-ESOPHAGEAL REFLUX DISEASE WITHOUT ESOPHAGITIS: Chronic | Status: ACTIVE | Noted: 2025-05-06

## 2025-05-07 LAB
ANION GAP SERPL CALC-SCNC: 15 MMOL/L — SIGNIFICANT CHANGE UP (ref 5–17)
BLD GP AB SCN SERPL QL: NEGATIVE — SIGNIFICANT CHANGE UP
BUN SERPL-MCNC: 36 MG/DL — HIGH (ref 7–23)
CALCIUM SERPL-MCNC: 8.3 MG/DL — LOW (ref 8.4–10.5)
CHLORIDE SERPL-SCNC: 105 MMOL/L — SIGNIFICANT CHANGE UP (ref 96–108)
CO2 SERPL-SCNC: 16 MMOL/L — LOW (ref 22–31)
CREAT SERPL-MCNC: 2.11 MG/DL — HIGH (ref 0.5–1.3)
EGFR: 30 ML/MIN/1.73M2 — LOW
EGFR: 30 ML/MIN/1.73M2 — LOW
GLUCOSE SERPL-MCNC: 135 MG/DL — HIGH (ref 70–99)
HCT VFR BLD CALC: 31.8 % — LOW (ref 39–50)
HGB BLD-MCNC: 10.3 G/DL — LOW (ref 13–17)
MAGNESIUM SERPL-MCNC: 1.7 MG/DL — SIGNIFICANT CHANGE UP (ref 1.6–2.6)
MCHC RBC-ENTMCNC: 28.6 PG — SIGNIFICANT CHANGE UP (ref 27–34)
MCHC RBC-ENTMCNC: 32.4 G/DL — SIGNIFICANT CHANGE UP (ref 32–36)
MCV RBC AUTO: 88.3 FL — SIGNIFICANT CHANGE UP (ref 80–100)
NRBC BLD AUTO-RTO: 0 /100 WBCS — SIGNIFICANT CHANGE UP (ref 0–0)
PHOSPHATE SERPL-MCNC: 4.6 MG/DL — HIGH (ref 2.5–4.5)
PLATELET # BLD AUTO: 214 K/UL — SIGNIFICANT CHANGE UP (ref 150–400)
POTASSIUM SERPL-MCNC: 4.2 MMOL/L — SIGNIFICANT CHANGE UP (ref 3.5–5.3)
POTASSIUM SERPL-SCNC: 4.2 MMOL/L — SIGNIFICANT CHANGE UP (ref 3.5–5.3)
RBC # BLD: 3.6 M/UL — LOW (ref 4.2–5.8)
RBC # FLD: 18.1 % — HIGH (ref 10.3–14.5)
RH IG SCN BLD-IMP: POSITIVE — SIGNIFICANT CHANGE UP
SODIUM SERPL-SCNC: 136 MMOL/L — SIGNIFICANT CHANGE UP (ref 135–145)
WBC # BLD: 8.92 K/UL — SIGNIFICANT CHANGE UP (ref 3.8–10.5)
WBC # FLD AUTO: 8.92 K/UL — SIGNIFICANT CHANGE UP (ref 3.8–10.5)

## 2025-05-07 PROCEDURE — 44205 LAP COLECTOMY PART W/ILEUM: CPT

## 2025-05-07 PROCEDURE — 88309 TISSUE EXAM BY PATHOLOGIST: CPT | Mod: 26

## 2025-05-07 PROCEDURE — 44205 LAP COLECTOMY PART W/ILEUM: CPT | Mod: AS

## 2025-05-07 PROCEDURE — 44205 LAP COLECTOMY PART W/ILEUM: CPT | Mod: 82

## 2025-05-07 PROCEDURE — S2900 ROBOTIC SURGICAL SYSTEM: CPT | Mod: NC,82

## 2025-05-07 PROCEDURE — S2900 ROBOTIC SURGICAL SYSTEM: CPT | Mod: NC

## 2025-05-07 DEVICE — STAPLER COVIDIEN ENDO GIA 80-3.8MM BLUE RELOAD: Type: IMPLANTABLE DEVICE | Status: FUNCTIONAL

## 2025-05-07 DEVICE — STAPLER COVIDIEN ENDO GIA 80-3.8MM BLUE: Type: IMPLANTABLE DEVICE | Status: FUNCTIONAL

## 2025-05-07 DEVICE — SURGICEL FIBRILLAR 4 X 4": Type: IMPLANTABLE DEVICE | Status: FUNCTIONAL

## 2025-05-07 DEVICE — XI STAPLER SUREFORM RELOAD 30 WHITE: Type: IMPLANTABLE DEVICE | Status: FUNCTIONAL

## 2025-05-07 RX ORDER — BUPIVACAINE 13.3 MG/ML
20 INJECTION, SUSPENSION, LIPOSOMAL INFILTRATION ONCE
Refills: 0 | Status: DISCONTINUED | OUTPATIENT
Start: 2025-05-07 | End: 2025-05-08

## 2025-05-07 RX ORDER — AMLODIPINE BESYLATE 10 MG/1
5 TABLET ORAL DAILY
Refills: 0 | Status: DISCONTINUED | OUTPATIENT
Start: 2025-05-07 | End: 2025-05-10

## 2025-05-07 RX ORDER — MOMETASONE FUROATE 220 UG/1
1 INHALANT RESPIRATORY (INHALATION) DAILY
Refills: 0 | Status: DISCONTINUED | OUTPATIENT
Start: 2025-05-07 | End: 2025-05-10

## 2025-05-07 RX ORDER — METOCLOPRAMIDE HCL 10 MG
10 TABLET ORAL EVERY 6 HOURS
Refills: 0 | Status: DISCONTINUED | OUTPATIENT
Start: 2025-05-07 | End: 2025-05-10

## 2025-05-07 RX ORDER — ERYTHROMYCIN 5 MG/G
1 OINTMENT OPHTHALMIC
Refills: 0 | Status: COMPLETED | OUTPATIENT
Start: 2025-05-07 | End: 2025-05-10

## 2025-05-07 RX ORDER — ERYTHROMYCIN 5 MG/G
1 OINTMENT OPHTHALMIC
Refills: 0 | Status: DISCONTINUED | OUTPATIENT
Start: 2025-05-07 | End: 2025-05-07

## 2025-05-07 RX ORDER — TETRACAINE HYDROCHLORIDE 5 MG/ML
1 SOLUTION OPHTHALMIC ONCE
Refills: 0 | Status: COMPLETED | OUTPATIENT
Start: 2025-05-07 | End: 2025-05-07

## 2025-05-07 RX ORDER — ACETAMINOPHEN 500 MG/5ML
975 LIQUID (ML) ORAL ONCE
Refills: 0 | Status: COMPLETED | OUTPATIENT
Start: 2025-05-07 | End: 2025-05-07

## 2025-05-07 RX ORDER — HEPARIN SODIUM 1000 [USP'U]/ML
5000 INJECTION INTRAVENOUS; SUBCUTANEOUS ONCE
Refills: 0 | Status: COMPLETED | OUTPATIENT
Start: 2025-05-07 | End: 2025-05-07

## 2025-05-07 RX ORDER — OXYCODONE HYDROCHLORIDE 30 MG/1
2.5 TABLET ORAL EVERY 6 HOURS
Refills: 0 | Status: DISCONTINUED | OUTPATIENT
Start: 2025-05-07 | End: 2025-05-10

## 2025-05-07 RX ORDER — SERTRALINE 100 MG/1
100 TABLET, FILM COATED ORAL DAILY
Refills: 0 | Status: DISCONTINUED | OUTPATIENT
Start: 2025-05-07 | End: 2025-05-10

## 2025-05-07 RX ORDER — ACETAMINOPHEN 500 MG/5ML
1000 LIQUID (ML) ORAL EVERY 6 HOURS
Refills: 0 | Status: DISCONTINUED | OUTPATIENT
Start: 2025-05-07 | End: 2025-05-10

## 2025-05-07 RX ORDER — OXYCODONE HYDROCHLORIDE 30 MG/1
5 TABLET ORAL EVERY 6 HOURS
Refills: 0 | Status: DISCONTINUED | OUTPATIENT
Start: 2025-05-07 | End: 2025-05-10

## 2025-05-07 RX ORDER — HEPARIN SODIUM 1000 [USP'U]/ML
5000 INJECTION INTRAVENOUS; SUBCUTANEOUS EVERY 8 HOURS
Refills: 0 | Status: DISCONTINUED | OUTPATIENT
Start: 2025-05-07 | End: 2025-05-10

## 2025-05-07 RX ORDER — HYDROMORPHONE/SOD CHLOR,ISO/PF 2 MG/10 ML
0.2 SYRINGE (ML) INJECTION ONCE
Refills: 0 | Status: DISCONTINUED | OUTPATIENT
Start: 2025-05-07 | End: 2025-05-07

## 2025-05-07 RX ADMIN — OXYCODONE HYDROCHLORIDE 2.5 MILLIGRAM(S): 30 TABLET ORAL at 18:00

## 2025-05-07 RX ADMIN — ERYTHROMYCIN 1 APPLICATION(S): 5 OINTMENT OPHTHALMIC at 15:21

## 2025-05-07 RX ADMIN — Medication 1000 MILLIGRAM(S): at 23:57

## 2025-05-07 RX ADMIN — Medication 0.2 MILLIGRAM(S): at 18:00

## 2025-05-07 RX ADMIN — Medication 975 MILLIGRAM(S): at 07:50

## 2025-05-07 RX ADMIN — TETRACAINE HYDROCHLORIDE 1 DROP(S): 5 SOLUTION OPHTHALMIC at 15:08

## 2025-05-07 RX ADMIN — Medication 80 MILLILITER(S): at 16:41

## 2025-05-07 RX ADMIN — HEPARIN SODIUM 5000 UNIT(S): 1000 INJECTION INTRAVENOUS; SUBCUTANEOUS at 21:34

## 2025-05-07 RX ADMIN — OXYCODONE HYDROCHLORIDE 2.5 MILLIGRAM(S): 30 TABLET ORAL at 16:48

## 2025-05-07 RX ADMIN — HEPARIN SODIUM 5000 UNIT(S): 1000 INJECTION INTRAVENOUS; SUBCUTANEOUS at 07:50

## 2025-05-07 RX ADMIN — ERYTHROMYCIN 1 APPLICATION(S): 5 OINTMENT OPHTHALMIC at 17:17

## 2025-05-07 RX ADMIN — Medication 1000 MILLIGRAM(S): at 17:17

## 2025-05-07 RX ADMIN — Medication 0.2 MILLIGRAM(S): at 17:17

## 2025-05-07 RX ADMIN — Medication 1000 MILLIGRAM(S): at 18:00

## 2025-05-07 RX ADMIN — Medication 80 MILLILITER(S): at 20:13

## 2025-05-07 NOTE — BRIEF OPERATIVE NOTE - OPERATION/FINDINGS
Infraumbilical Persaud cutdown entry. RLQ omental adhesions to cecum and anterior abdominal wall lysed. Robotic ports placed. Robot docked. Lesion noted in mid transverse colon. Fatty mesentery of the ileocolic pedicle dissected lateral to medial using monopolar scissors and Vessel sealer. Ileocolic artery taken using Sureform robot stapler white load. Lesser sac entered above distal margin of transverse colon and hepatic flexure mobilized with Vessel sealer. Ascending colon mobilized in lateral to medial fashion along the white line if toldt. Robot undocked. Colon extracorporealized via paraumbilical incision and proximal margin divided with WOJCIECH blue load x1. Distal margin divided with WOJCIECH blue load x1 and specimen sent for final. Side to side isoperistaltic anastomosis created with WOJCIECH blue load x1. Crotch stich placed with 3-0 PDS. Staple line oversewn with 3-0 PDS running. Ileocolic anastomosis reduced into abdomen, mesentery inspected-straight. Hemostasis of omental oozing w/ electrosurgery. Closing tray utilized. Midline closed with looped 1-0 PDS. Umbilicus tethered with simple vicryl. Skin closed with interrupted vicryl and monocryl.

## 2025-05-07 NOTE — DISCHARGE NOTE PROVIDER - NSDCFUADDINST_GEN_ALL_CORE_FT
Additional Instructions	General Discharge Instructions:  Please resume all regular home medications unless specifically advised not to take a particular medication. Also, please take any new medications as prescribed.  Please get plenty of rest, continue to ambulate several times per day, and drink adequate amounts of fluids. Avoid lifting weights greater than 5-10 lbs until you follow-up with your surgeon, who will instruct you further regarding activity restrictions.  Avoid driving or operating heavy machinery while taking pain medications.  Please follow-up with your surgeon and Primary Care Provider (PCP) as advised.  Please follow up with Dr. Avitia in 1-2 weeks by calling his office at  [phone]    Medications:   -Please take over the counter Tylenol (650mg-1000mg) every 6 hours for mild-moderate pain control. Do not exceed 4000mg of Tylenol daily.   -Please take Oxycodone 5mg every 6-8 hours as per needed for severe pain.  -Please take Colace to prevent constipation from Oxycodone. Please take if you are taking Oxycodone.    Incision Care:  *Please call your doctor or nurse practitioner if you have increased pain, swelling, redness, or drainage from the incision site.  *Avoid swimming and baths until your follow-up appointment.  *You may shower, and wash surgical incisions with a mild soap and warm water. Gently pat the area dry.    Warning Signs:  Please call your doctor or nurse practitioner if you experience the following:  *You experience new chest pain, pressure, squeezing or tightness.  *New or worsening cough, shortness of breath, or wheeze.  *If you are vomiting and cannot keep down fluids or your medications.  *You are getting dehydrated due to continued vomiting, diarrhea, or other reasons. Signs of dehydration include dry mouth, rapid heartbeat, or feeling dizzy or faint when standing.  *You see blood or dark/black material when you vomit or have a bowel movement.  *You experience burning when you urinate, have blood in your urine, or experience a discharge.  *Your pain is not improving within 8-12 hours or is not gone within 24 hours. Call or return immediately if your pain is getting worse, changes location, or moves to your chest or back.  *You have shaking chills, or fever greater than 101.5 degrees Fahrenheit or 38 degrees Celsius.  *Any change in your symptoms, or any new symptoms that concern you.

## 2025-05-07 NOTE — DISCHARGE NOTE PROVIDER - NSDCCPGOAL_GEN_ALL_CORE_FT
To get better and follow your care plan as instructed.
"shakes after dental procedure at age 15", family history unknown

## 2025-05-07 NOTE — DISCHARGE NOTE PROVIDER - HOSPITAL COURSE
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 Preop CT staging negative for mets. On 5/7/20256, pt presented to [facility] for an extended right hemicolectomy with Dr. Avitia. His postoperative course was unremarkable with advancement of diet, passing trial of void, and pain control. On day of discharge patient was stable to be d/c'd home.    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 Preop CT staging negative for mets. On 5/7/20256, pt presented to [facility] for an extended right hemicolectomy with Dr. Avitia. His postoperative course was unremarkable with advancement of diet, passing trial of void, and pain control. On day of discharge patient was stable to be d/c'd home. 85M w/ PMH of HTN, COPD, BPH, CKD stage IV (baseline Cr 1.8), GERD, MGUS ,sarcoid, prostate cancer s/p resection and recently diagnosed adenocarcinoma of the transverse colon on diagnositic w/u for symptomatic anemia. Preop CT staging negative for mets. On 5/7/20256, pt presented to [facility] for an right hemicolectomy with Dr. Avitia. His postoperative course was unremarkable with advancement of diet, passing trial of void, and pain control. On day of discharge patient was stable to be d/c'd home.Final pathology showed T3 N0 colon carcinoma (25 negative nodes)

## 2025-05-07 NOTE — CHART NOTE - NSCHARTNOTEFT_GEN_A_CORE
Informed by RN patient c/o left eye pain.  Ordered tetracaine however per RN team has written orders for corneal abrasion treatment.

## 2025-05-07 NOTE — DISCHARGE NOTE PROVIDER - NSDCMRMEDTOKEN_GEN_ALL_CORE_FT
allopurinol 300 mg oral tablet: 1 tab(s) orally once a day  amLODIPine 5 mg oral tablet: 1 tab(s) orally once a day  losartan 50 mg oral tablet: 1 tab(s) orally once a day  Qvar Redihaler 80 mcg/inh inhalation aerosol: 1 puff(s) inhaled 2 times a day  rosuvastatin 5 mg oral tablet: 1 tab(s) orally once a day  sertraline 100 mg oral tablet: 1 tab(s) orally once a day  sildenafil 100 mg oral tablet: 1 tab(s) orally once a day  Striverdi Respimat 2.5 mcg/inh inhalation aerosol: 2 puff(s) inhaled every 24 hours   allopurinol 300 mg oral tablet: 1 tab(s) orally once a day  amLODIPine 5 mg oral tablet: 1 tab(s) orally once a day  Colace 100 mg oral capsule: 1 cap(s) orally every 12 hours as needed for  constipation  losartan 50 mg oral tablet: 1 tab(s) orally once a day  oxyCODONE 5 mg oral tablet: 1 tab(s) orally every 6 hours as needed for  severe pain MDD: 4  Qvar Redihaler 80 mcg/inh inhalation aerosol: 1 puff(s) inhaled 2 times a day  rosuvastatin 5 mg oral tablet: 1 tab(s) orally once a day  sertraline 100 mg oral tablet: 1 tab(s) orally once a day  sildenafil 100 mg oral tablet: 1 tab(s) orally once a day  Striverdi Respimat 2.5 mcg/inh inhalation aerosol: 2 puff(s) inhaled every 24 hours

## 2025-05-07 NOTE — H&P ADULT - NSICDXPASTMEDICALHX_GEN_ALL_CORE_FT
PAST MEDICAL HISTORY:  Arthritis     BPH (benign prostatic hyperplasia) "PAE" (Prostate seed procedure per patient)    Emphysema of lung     GERD (gastroesophageal reflux disease)     H/O emphysema     H/O: gout     Hypertension

## 2025-05-07 NOTE — H&P ADULT - NSICDXPASTSURGICALHX_GEN_ALL_CORE_FT
PAST SURGICAL HISTORY:  H/O arthroscopy of knee bilateral    H/O carpal tunnel syndrome     H/O right cataract extraction Patient reports B/L eyes    H/O total shoulder replacement, right     History of lipoma

## 2025-05-07 NOTE — BRIEF OPERATIVE NOTE - NSICDXBRIEFPROCEDURE_GEN_ALL_CORE_FT
PROCEDURES:  Colectomy, right and transverse, laparoscopic 07-May-2025 13:50:50 robotic assisted Arely Nava

## 2025-05-07 NOTE — DISCHARGE NOTE PROVIDER - NSDCCPTREATMENT_GEN_ALL_CORE_FT
PRINCIPAL PROCEDURE  Procedure: Colectomy, right and transverse, laparoscopic  Findings and Treatment: robotic assisted

## 2025-05-07 NOTE — H&P ADULT - HISTORY OF PRESENT ILLNESS
HPI:  85M w/ PMH of HTN, COPD, BPH, CKD stage IV (baseline Cr 1.8), GERD, MGUS ,sarcoid, ??prostate cancer s/p resection and recently diagnosed stage I adenocarcinoma of the transverse colon on diagnositic cscope for symptomatic anemia. Preop CT staging negative for mets. CEA: 7.2    4/15/25 - EGD and Colonoscopy revealed a 2 cm polyp in the sigmoid colon s/p polypectomy, a 1 cm polyp in the descending colon s/p polypectomy, a 3 mm polyp in the ascending colon s/p polypectomy, a 1 cm polyp in the cecum s/p polypectomy, a 5 mm polyp in the colon s/p polypectomy and a 5 cm MASS in the transverse colon s/p biopsy.    PMH: HTN, COPD, BPH, CKD stage IV (baseline Cr 1.8), GERD, MGUS ,sarcoid  PSH: Prostate CA  Medications: Allopurinol 300 MG Oral Tablet, Famotidine 40 MG Oral Tablet, Glucosamine TABS,Iron TABS, Losartan Potassium 50 MG Oral Tablet, Miebo 1.338 GM/ML Ophthalmic Solution, Multi-Vitamin TABS, Pantoprazole Sodium 40 MG Oral Tablet Delayed Release, Rosuvastatin Calcium 5 MG Oral Tablet, Sertraline HCl - 100 MG Oral Tablet, Sildenafil Citrate 100 MG Oral Tablet, Tadalafil 20 MG Oral Tablet; TAKE 1 TABLET 1 HOUR BEFORE ACTIVITY AS NEEDED  Allergies: NKDA  SocHX: Former smoker  Fam Hx                 HPI:  85M w/ PMH of HTN, COPD, BPH, CKD stage IV (baseline Cr 1.8), GERD, MGUS ,sarcoid, ??prostate cancer s/p resection and recently diagnosed colon mass on colonoscopy done for symptomatic anemia. Preop CT staging negative for mets. CEA: 7.2    4/15/25 - EGD and Colonoscopy revealed a 2 cm polyp in the sigmoid colon s/p polypectomy, a 1 cm polyp in the descending colon s/p polypectomy, a 3 mm polyp in the ascending colon s/p polypectomy, a 1 cm polyp in the cecum s/p polypectomy, a 5 mm polyp in the colon s/p polypectomy and a 5 cm MASS in the transverse colon s/p biopsy.    PMH: HTN, COPD, BPH, CKD stage IV (baseline Cr 1.8), GERD, MGUS ,sarcoid  PSH: Prostate CA  Medications: Allopurinol 300 MG Oral Tablet, Famotidine 40 MG Oral Tablet, Glucosamine TABS,Iron TABS, Losartan Potassium 50 MG Oral Tablet, Miebo 1.338 GM/ML Ophthalmic Solution, Multi-Vitamin TABS, Pantoprazole Sodium 40 MG Oral Tablet Delayed Release, Rosuvastatin Calcium 5 MG Oral Tablet, Sertraline HCl - 100 MG Oral Tablet, Sildenafil Citrate 100 MG Oral Tablet, Tadalafil 20 MG Oral Tablet; TAKE 1 TABLET 1 HOUR BEFORE ACTIVITY AS NEEDED  Allergies: NKDA  SocHX: Former smoker  Fam Hx

## 2025-05-07 NOTE — DISCHARGE NOTE PROVIDER - CARE PROVIDER_API CALL
Juanpablo Avitia  Surgical Oncology  [address] [address]  Phone: [phone]  Fax: [phone]  Follow Up Time: 1 week

## 2025-05-07 NOTE — H&P ADULT - ASSESSMENT
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 Preop CT staging negative for mets. CEA: 7.2.    Consent signed and in chart  Ok to proceed to OR

## 2025-05-07 NOTE — H&P ADULT - NSHPPHYSICALEXAM_GEN_ALL_CORE
Vital Signs Last 24 Hrs  T(C): 36.9 (07 May 2025 12:55), Max: 36.9 (07 May 2025 12:55)  T(F): 98.5 (07 May 2025 12:55), Max: 98.5 (07 May 2025 12:55)  HR: 84 (07 May 2025 13:10) (72 - 84)  BP: 139/67 (07 May 2025 13:10) (124/67 - 153/72)  BP(mean): 97 (07 May 2025 13:10) (91 - 104)  RR: 27 (07 May 2025 13:10) (19 - 27)  SpO2: 96% (07 May 2025 13:10) (96% - 99%)      I&O's Detail      General: NAD, resting comfortably in bed  C/V: NSR  Pulm: Nonlabored breathing, no respiratory distress  Abd: soft, NT/ND.  Extrem: WWP, no edema,

## 2025-05-07 NOTE — CHART NOTE - NSCHARTNOTEFT_GEN_A_CORE
Procedure: Extended right hemicolectomy   Surgeon: Dr. Avitia     S: Pt's main complaint is pain/irritation in the left eye. Endorses mild abdominal pain. Denies nausea, fl, bm.     O:  T(C): 36.3 (05-07-25 @ 14:55), Max: 36.9 (05-07-25 @ 12:55)  T(F): 97.4 (05-07-25 @ 14:55), Max: 98.5 (05-07-25 @ 12:55)  HR: 87 (05-07-25 @ 14:55) (82 - 87)  BP: 157/82 (05-07-25 @ 14:55) (139/67 - 167/78)  RR: 15 (05-07-25 @ 14:55) (15 - 22)  SpO2: 100% (05-07-25 @ 14:55) (96% - 100%)  Wt(kg): --                        10.3   8.92  )-----------( 214      ( 07 May 2025 13:20 )             31.8     05-07    136  |  105  |  36[H]  ----------------------------<  135[H]  4.2   |  16[L]  |  2.11[H]    Ca    8.3[L]      07 May 2025 13:20  Phos  4.6     05-07  Mg     1.7     05-07      PHYSICAL EXAM:  General: NAD, pt resting comfortably in bed  HEENT: L eye with mild redness, tearing   Pulm: No respiratory distress, nonlabored breathing, on room air  CVS: NSR, HDS  Abd: Soft, NT, ND  Extremities: WWP, no edema Procedure:  right hemicolectomy   Surgeon: Dr. Avitia     S: Pt's main complaint is pain/irritation in the left eye. Endorses mild abdominal pain. Denies nausea, fl, bm.     O:  T(C): 36.3 (05-07-25 @ 14:55), Max: 36.9 (05-07-25 @ 12:55)  T(F): 97.4 (05-07-25 @ 14:55), Max: 98.5 (05-07-25 @ 12:55)  HR: 87 (05-07-25 @ 14:55) (82 - 87)  BP: 157/82 (05-07-25 @ 14:55) (139/67 - 167/78)  RR: 15 (05-07-25 @ 14:55) (15 - 22)  SpO2: 100% (05-07-25 @ 14:55) (96% - 100%)  Wt(kg): --                        10.3   8.92  )-----------( 214      ( 07 May 2025 13:20 )             31.8     05-07    136  |  105  |  36[H]  ----------------------------<  135[H]  4.2   |  16[L]  |  2.11[H]    Ca    8.3[L]      07 May 2025 13:20  Phos  4.6     05-07  Mg     1.7     05-07      PHYSICAL EXAM:  General: NAD, pt resting comfortably in bed  HEENT: L eye with mild redness, tearing   Pulm: No respiratory distress, nonlabored breathing, on room air  CVS: NSR, HDS  Abd: Soft, NT, ND  Extremities: WWP, no edema

## 2025-05-07 NOTE — DISCHARGE NOTE PROVIDER - NSDCCPCAREPLAN_GEN_ALL_CORE_FT
PRINCIPAL DISCHARGE DIAGNOSIS  Diagnosis: Colon adenocarcinoma  Assessment and Plan of Treatment:

## 2025-05-07 NOTE — ASU PREOP CHECKLIST - PATIENT'S PERSONAL PROPERTY GIVEN TO
Processing Note: The specimen was frozen in the cryostat, sectioned and stained.
Bill [id number] For Specimen Handling/Conveyance To Laboratory?: no
Detail Level: Detailed
Size Of Lesion In Cm (Optional): 0.6
Additional Anesthesia Volume In Cc (Will Not Render If 0): 0
Notification Instructions: Patient will be notified of biopsy results. However, patient instructed to call the office if not contacted within 2 weeks.
Anesthesia Volume In Cc: 0.8
Depth Of Biopsy: dermis
Wound Care: Bacitracin
Biopsy Method: 15 blade
Anesthesia Type: 1% lidocaine with 1:100,000 epinephrine and 408mcg clindamycin/ml and a 1:10 solution of 8.4% sodium bicarbonate
Hemostasis: Drysol
Body Location Override (Optional - Billing Will Still Be Based On Selected Body Map Location If Applicable): left lat suprabrow
Post-Care Instructions: I reviewed with the patient in detail post-care instructions. Patient is to keep the biopsy site dry overnight, and then apply bacitracin twice daily until healed. Patient may apply hydrogen peroxide soaks to remove any crusting.
Number Of Blocks: 1
Biopsy Type: Frozen Section
Billing Type: Third-Party Bill
Consent: Written consent was obtained and risks were reviewed including but not limited to scarring, infection, bleeding, scabbing, incomplete removal, nerve damage and allergy to anesthesia.
Size Of Lesion In Cm (Optional): 1.4
Size Of Lesion In Cm (Optional): 0.7
Body Location Override (Optional - Billing Will Still Be Based On Selected Body Map Location If Applicable): left med upper FH
Body Location Override (Optional - Billing Will Still Be Based On Selected Body Map Location If Applicable): left malar cheek
Body Location Override (Optional - Billing Will Still Be Based On Selected Body Map Location If Applicable): right lat cheek
son Enrique/family member

## 2025-05-08 LAB
ANION GAP SERPL CALC-SCNC: 14 MMOL/L — SIGNIFICANT CHANGE UP (ref 5–17)
BUN SERPL-MCNC: 30 MG/DL — HIGH (ref 7–23)
CALCIUM SERPL-MCNC: 8.6 MG/DL — SIGNIFICANT CHANGE UP (ref 8.4–10.5)
CHLORIDE SERPL-SCNC: 109 MMOL/L — HIGH (ref 96–108)
CO2 SERPL-SCNC: 16 MMOL/L — LOW (ref 22–31)
CREAT SERPL-MCNC: 2.1 MG/DL — HIGH (ref 0.5–1.3)
EGFR: 31 ML/MIN/1.73M2 — LOW
EGFR: 31 ML/MIN/1.73M2 — LOW
GLUCOSE SERPL-MCNC: 98 MG/DL — SIGNIFICANT CHANGE UP (ref 70–99)
HCT VFR BLD CALC: 34.4 % — LOW (ref 39–50)
HGB BLD-MCNC: 10.8 G/DL — LOW (ref 13–17)
MAGNESIUM SERPL-MCNC: 1.9 MG/DL — SIGNIFICANT CHANGE UP (ref 1.6–2.6)
MCHC RBC-ENTMCNC: 28.1 PG — SIGNIFICANT CHANGE UP (ref 27–34)
MCHC RBC-ENTMCNC: 31.4 G/DL — LOW (ref 32–36)
MCV RBC AUTO: 89.6 FL — SIGNIFICANT CHANGE UP (ref 80–100)
NRBC BLD AUTO-RTO: 0 /100 WBCS — SIGNIFICANT CHANGE UP (ref 0–0)
PHOSPHATE SERPL-MCNC: 4.8 MG/DL — HIGH (ref 2.5–4.5)
PLATELET # BLD AUTO: 245 K/UL — SIGNIFICANT CHANGE UP (ref 150–400)
POTASSIUM SERPL-MCNC: 4.4 MMOL/L — SIGNIFICANT CHANGE UP (ref 3.5–5.3)
POTASSIUM SERPL-SCNC: 4.4 MMOL/L — SIGNIFICANT CHANGE UP (ref 3.5–5.3)
RBC # BLD: 3.84 M/UL — LOW (ref 4.2–5.8)
RBC # FLD: 18.2 % — HIGH (ref 10.3–14.5)
SODIUM SERPL-SCNC: 139 MMOL/L — SIGNIFICANT CHANGE UP (ref 135–145)
WBC # BLD: 7.5 K/UL — SIGNIFICANT CHANGE UP (ref 3.8–10.5)
WBC # FLD AUTO: 7.5 K/UL — SIGNIFICANT CHANGE UP (ref 3.8–10.5)

## 2025-05-08 RX ADMIN — Medication 1000 MILLIGRAM(S): at 11:39

## 2025-05-08 RX ADMIN — Medication 1000 MILLIGRAM(S): at 04:10

## 2025-05-08 RX ADMIN — HEPARIN SODIUM 5000 UNIT(S): 1000 INJECTION INTRAVENOUS; SUBCUTANEOUS at 05:41

## 2025-05-08 RX ADMIN — Medication 1000 MILLIGRAM(S): at 23:26

## 2025-05-08 RX ADMIN — Medication 40 MILLILITER(S): at 17:49

## 2025-05-08 RX ADMIN — OXYCODONE HYDROCHLORIDE 2.5 MILLIGRAM(S): 30 TABLET ORAL at 09:45

## 2025-05-08 RX ADMIN — SERTRALINE 100 MILLIGRAM(S): 100 TABLET, FILM COATED ORAL at 11:39

## 2025-05-08 RX ADMIN — ERYTHROMYCIN 1 APPLICATION(S): 5 OINTMENT OPHTHALMIC at 17:47

## 2025-05-08 RX ADMIN — HEPARIN SODIUM 5000 UNIT(S): 1000 INJECTION INTRAVENOUS; SUBCUTANEOUS at 21:54

## 2025-05-08 RX ADMIN — Medication 1000 MILLIGRAM(S): at 05:54

## 2025-05-08 RX ADMIN — OXYCODONE HYDROCHLORIDE 2.5 MILLIGRAM(S): 30 TABLET ORAL at 10:15

## 2025-05-08 RX ADMIN — Medication 1000 MILLIGRAM(S): at 05:40

## 2025-05-08 RX ADMIN — ERYTHROMYCIN 1 APPLICATION(S): 5 OINTMENT OPHTHALMIC at 05:40

## 2025-05-08 RX ADMIN — Medication 1000 MILLIGRAM(S): at 12:15

## 2025-05-08 RX ADMIN — HEPARIN SODIUM 5000 UNIT(S): 1000 INJECTION INTRAVENOUS; SUBCUTANEOUS at 14:13

## 2025-05-08 RX ADMIN — Medication 1000 MILLIGRAM(S): at 17:47

## 2025-05-08 RX ADMIN — AMLODIPINE BESYLATE 5 MILLIGRAM(S): 10 TABLET ORAL at 05:40

## 2025-05-08 NOTE — PROGRESS NOTE ADULT - ASSESSMENT
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 Preop CT staging negative for mets. CEA: 7.2. S/p extended right hemicolectomy on 5/7.     ERAS/CLD  Armando  Erythromycin opthalamic ointment   Pain/Nausea PRN   Pain/Nausea control PRN   SQH/SCDs/OOBA/IS   AM labs

## 2025-05-08 NOTE — PROGRESS NOTE ADULT - SUBJECTIVE AND OBJECTIVE BOX
SUBJECTIVE:      MEDICATIONS  (STANDING):  acetaminophen     Tablet .. 1000 milliGRAM(s) Oral every 6 hours  amLODIPine   Tablet 5 milliGRAM(s) Oral daily  BUpivacaine liposome 1.3% Injectable (no eMAR) 20 milliLiter(s) Local Injection once  erythromycin   Ointment 1 Application(s) Left EYE two times a day  heparin   Injectable 5000 Unit(s) SubCutaneous every 8 hours  mometasone 220 MICROgram(s) Inhaler 1 Puff(s) Inhalation daily  sertraline 100 milliGRAM(s) Oral daily  sodium chloride 0.9%. 1000 milliLiter(s) (80 mL/Hr) IV Continuous <Continuous>    MEDICATIONS  (PRN):  metoclopramide Injectable 10 milliGRAM(s) IV Push every 6 hours PRN Nausea and/or Vomiting  oxyCODONE    IR 2.5 milliGRAM(s) Oral every 6 hours PRN Moderate Pain (4 - 6)  oxyCODONE    IR 5 milliGRAM(s) Oral every 6 hours PRN Severe Pain (7 - 10)      Vital Signs Last 24 Hrs  T(C): 36.7 (08 May 2025 04:25), Max: 37 (07 May 2025 22:15)  T(F): 98.1 (08 May 2025 04:25), Max: 98.6 (07 May 2025 22:15)  HR: 74 (08 May 2025 05:35) (72 - 90)  BP: 134/63 (08 May 2025 05:35) (108/53 - 167/78)  BP(mean): 91 (08 May 2025 05:35) (77 - 117)  RR: 18 (08 May 2025 05:35) (15 - 22)  SpO2: 96% (08 May 2025 05:35) (93% - 100%)    Parameters below as of 08 May 2025 05:35  Patient On (Oxygen Delivery Method): nasal cannula  O2 Flow (L/min): 2      PHYSICAL EXAM:      Constitutional: A&Ox3    Breasts:    Respiratory: non labored breathing, no respiratory distress    Cardiovascular: NSR, RRR    Gastrointestinal:                 Incision:    Genitourinary:    Extremities: (-) edema                  I&O's Detail    07 May 2025 07:01  -  08 May 2025 06:05  --------------------------------------------------------  IN:    Oral Fluid: 325 mL    sodium chloride 0.9%: 1360 mL  Total IN: 1685 mL    OUT:    Indwelling Catheter - Urethral (mL): 1425 mL  Total OUT: 1425 mL    Total NET: 260 mL          LABS:                        10.3   8.92  )-----------( 214      ( 07 May 2025 13:20 )             31.8     05-07    136  |  105  |  36[H]  ----------------------------<  135[H]  4.2   |  16[L]  |  2.11[H]    Ca    8.3[L]      07 May 2025 13:20  Phos  4.6     05-07  Mg     1.7     05-07        Urinalysis Basic - ( 07 May 2025 13:20 )    Color: x / Appearance: x / SG: x / pH: x  Gluc: 135 mg/dL / Ketone: x  / Bili: x / Urobili: x   Blood: x / Protein: x / Nitrite: x   Leuk Esterase: x / RBC: x / WBC x   Sq Epi: x / Non Sq Epi: x / Bacteria: x        RADIOLOGY & ADDITIONAL STUDIES:

## 2025-05-08 NOTE — PROGRESS NOTE ADULT - ASSESSMENT
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 Preop CT staging negative for mets. CEA: 7.2. S/p extended right hemicolectomy on 5/7.     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 Preop CT staging negative for mets. CEA: 7.2. S/p  right hemicolectomy on 5/7.     ERAS/CLD  Armando  Erythromycin opthalamic ointment   Pain/Nausea PRN   Pain/Nausea control PRN   SQH/SCDs/OOBA/IS   AM labs

## 2025-05-08 NOTE — PROGRESS NOTE ADULT - SUBJECTIVE AND OBJECTIVE BOX
INTERVAL HPI/OVERNIGHT EVENTS: ON: pain controlled, -n/-v, -bf,     STATUS POST:  5/7- RA extended Right - EBL 50, IVF 2000,     POST OPERATIVE DAY #: 1    SUBJECTIVE: Pt seen and examined at bedside this am by surgery team.    MEDICATIONS  (STANDING):  acetaminophen     Tablet .. 1000 milliGRAM(s) Oral every 6 hours  amLODIPine   Tablet 5 milliGRAM(s) Oral daily  BUpivacaine liposome 1.3% Injectable (no eMAR) 20 milliLiter(s) Local Injection once  erythromycin   Ointment 1 Application(s) Left EYE two times a day  heparin   Injectable 5000 Unit(s) SubCutaneous every 8 hours  mometasone 220 MICROgram(s) Inhaler 1 Puff(s) Inhalation daily  sertraline 100 milliGRAM(s) Oral daily  sodium chloride 0.9%. 1000 milliLiter(s) (80 mL/Hr) IV Continuous <Continuous>    MEDICATIONS  (PRN):  metoclopramide Injectable 10 milliGRAM(s) IV Push every 6 hours PRN Nausea and/or Vomiting  oxyCODONE    IR 2.5 milliGRAM(s) Oral every 6 hours PRN Moderate Pain (4 - 6)  oxyCODONE    IR 5 milliGRAM(s) Oral every 6 hours PRN Severe Pain (7 - 10)      Vital Signs Last 24 Hrs  T(C): 36.7 (08 May 2025 04:25), Max: 37 (07 May 2025 22:15)  T(F): 98.1 (08 May 2025 04:25), Max: 98.6 (07 May 2025 22:15)  HR: 74 (08 May 2025 03:45) (72 - 90)  BP: 108/53 (08 May 2025 03:45) (108/53 - 167/78)  BP(mean): 77 (08 May 2025 03:45) (77 - 117)  RR: 17 (08 May 2025 03:45) (15 - 22)  SpO2: 96% (08 May 2025 03:45) (93% - 100%)    Parameters below as of 08 May 2025 03:45  Patient On (Oxygen Delivery Method): nasal cannula  O2 Flow (L/min): 2      Physical Exam:  Constitutional: A&Ox3, NAD  Respiratory: normal work of breathing  Cardiovascular: NSR, RRR  Abdomen: soft, non-tender, non distended, no rebound or guarding  Incision:  Ostomy:  Genitourinary:  Legs: WWP, SCDs in place, no calf tenderness        I&O's Detail    07 May 2025 07:01  -  08 May 2025 05:49  --------------------------------------------------------  IN:    Oral Fluid: 225 mL    sodium chloride 0.9%: 1200 mL  Total IN: 1425 mL    OUT:    Indwelling Catheter - Urethral (mL): 1425 mL  Total OUT: 1425 mL    Total NET: 0 mL          LABS:                        10.3   8.92  )-----------( 214      ( 07 May 2025 13:20 )             31.8     05-07    136  |  105  |  36[H]  ----------------------------<  135[H]  4.2   |  16[L]  |  2.11[H]    Ca    8.3[L]      07 May 2025 13:20  Phos  4.6     05-07  Mg     1.7     05-07        Urinalysis Basic - ( 07 May 2025 13:20 )    Color: x / Appearance: x / SG: x / pH: x  Gluc: 135 mg/dL / Ketone: x  / Bili: x / Urobili: x   Blood: x / Protein: x / Nitrite: x   Leuk Esterase: x / RBC: x / WBC x   Sq Epi: x / Non Sq Epi: x / Bacteria: x        RADIOLOGY & ADDITIONAL STUDIES: INTERVAL HPI/OVERNIGHT EVENTS: ON: pain controlled, -n/-v, -bf,     STATUS POST:  5/7- RA extended Right - EBL 50, IVF 2000,     POST OPERATIVE DAY #: 1    SUBJECTIVE: Pt seen and examined at bedside this am by surgery team. Pt reports well-controlled pain, -n/-v - bf. Pt also reports less rt eye pain     MEDICATIONS  (STANDING):  acetaminophen     Tablet .. 1000 milliGRAM(s) Oral every 6 hours  amLODIPine   Tablet 5 milliGRAM(s) Oral daily  BUpivacaine liposome 1.3% Injectable (no eMAR) 20 milliLiter(s) Local Injection once  erythromycin   Ointment 1 Application(s) Left EYE two times a day  heparin   Injectable 5000 Unit(s) SubCutaneous every 8 hours  mometasone 220 MICROgram(s) Inhaler 1 Puff(s) Inhalation daily  sertraline 100 milliGRAM(s) Oral daily  sodium chloride 0.9%. 1000 milliLiter(s) (80 mL/Hr) IV Continuous <Continuous>    MEDICATIONS  (PRN):  metoclopramide Injectable 10 milliGRAM(s) IV Push every 6 hours PRN Nausea and/or Vomiting  oxyCODONE    IR 2.5 milliGRAM(s) Oral every 6 hours PRN Moderate Pain (4 - 6)  oxyCODONE    IR 5 milliGRAM(s) Oral every 6 hours PRN Severe Pain (7 - 10)      Vital Signs Last 24 Hrs  T(C): 36.7 (08 May 2025 04:25), Max: 37 (07 May 2025 22:15)  T(F): 98.1 (08 May 2025 04:25), Max: 98.6 (07 May 2025 22:15)  HR: 74 (08 May 2025 03:45) (72 - 90)  BP: 108/53 (08 May 2025 03:45) (108/53 - 167/78)  BP(mean): 77 (08 May 2025 03:45) (77 - 117)  RR: 17 (08 May 2025 03:45) (15 - 22)  SpO2: 96% (08 May 2025 03:45) (93% - 100%)    Parameters below as of 08 May 2025 03:45  Patient On (Oxygen Delivery Method): nasal cannula  O2 Flow (L/min): 2      Physical Exam:  Constitutional: A&Ox3, NAD  Respiratory: normal work of breathing  Cardiovascular: NSR, RRR  Abdomen: soft, mildly-tender, non distended, no rebound or guarding  Incision: midline incision C/D/I, erythematous   Ostomy:  Genitourinary: Armando in place, voiding.   Legs: WWP, SCDs in place, no calf tenderness        I&O's Detail    07 May 2025 07:01  -  08 May 2025 05:49  --------------------------------------------------------  IN:    Oral Fluid: 225 mL    sodium chloride 0.9%: 1200 mL  Total IN: 1425 mL    OUT:    Indwelling Catheter - Urethral (mL): 1425 mL  Total OUT: 1425 mL    Total NET: 0 mL          LABS:                        10.3   8.92  )-----------( 214      ( 07 May 2025 13:20 )             31.8     05-07    136  |  105  |  36[H]  ----------------------------<  135[H]  4.2   |  16[L]  |  2.11[H]    Ca    8.3[L]      07 May 2025 13:20  Phos  4.6     05-07  Mg     1.7     05-07        Urinalysis Basic - ( 07 May 2025 13:20 )    Color: x / Appearance: x / SG: x / pH: x  Gluc: 135 mg/dL / Ketone: x  / Bili: x / Urobili: x   Blood: x / Protein: x / Nitrite: x   Leuk Esterase: x / RBC: x / WBC x   Sq Epi: x / Non Sq Epi: x / Bacteria: x        RADIOLOGY & ADDITIONAL STUDIES: INTERVAL HPI/OVERNIGHT EVENTS: ON: pain controlled, -n/-v, -bf,     STATUS POST:  5/7- RA  Right - EBL 50, IVF 2000,     POST OPERATIVE DAY #: 1    SUBJECTIVE: Pt seen and examined at bedside this am by surgery team. Pt reports well-controlled pain, -n/-v - bf. Pt also reports less rt eye pain     MEDICATIONS  (STANDING):  acetaminophen     Tablet .. 1000 milliGRAM(s) Oral every 6 hours  amLODIPine   Tablet 5 milliGRAM(s) Oral daily  BUpivacaine liposome 1.3% Injectable (no eMAR) 20 milliLiter(s) Local Injection once  erythromycin   Ointment 1 Application(s) Left EYE two times a day  heparin   Injectable 5000 Unit(s) SubCutaneous every 8 hours  mometasone 220 MICROgram(s) Inhaler 1 Puff(s) Inhalation daily  sertraline 100 milliGRAM(s) Oral daily  sodium chloride 0.9%. 1000 milliLiter(s) (80 mL/Hr) IV Continuous <Continuous>    MEDICATIONS  (PRN):  metoclopramide Injectable 10 milliGRAM(s) IV Push every 6 hours PRN Nausea and/or Vomiting  oxyCODONE    IR 2.5 milliGRAM(s) Oral every 6 hours PRN Moderate Pain (4 - 6)  oxyCODONE    IR 5 milliGRAM(s) Oral every 6 hours PRN Severe Pain (7 - 10)      Vital Signs Last 24 Hrs  T(C): 36.7 (08 May 2025 04:25), Max: 37 (07 May 2025 22:15)  T(F): 98.1 (08 May 2025 04:25), Max: 98.6 (07 May 2025 22:15)  HR: 74 (08 May 2025 03:45) (72 - 90)  BP: 108/53 (08 May 2025 03:45) (108/53 - 167/78)  BP(mean): 77 (08 May 2025 03:45) (77 - 117)  RR: 17 (08 May 2025 03:45) (15 - 22)  SpO2: 96% (08 May 2025 03:45) (93% - 100%)    Parameters below as of 08 May 2025 03:45  Patient On (Oxygen Delivery Method): nasal cannula  O2 Flow (L/min): 2      Physical Exam:  Constitutional: A&Ox3, NAD  Respiratory: normal work of breathing  Cardiovascular: NSR, RRR  Abdomen: soft, mildly-tender, non distended, no rebound or guarding  Incision: midline incision C/D/I, erythematous   Ostomy:  Genitourinary: Armando in place, voiding.   Legs: WWP, SCDs in place, no calf tenderness        I&O's Detail    07 May 2025 07:01  -  08 May 2025 05:49  --------------------------------------------------------  IN:    Oral Fluid: 225 mL    sodium chloride 0.9%: 1200 mL  Total IN: 1425 mL    OUT:    Indwelling Catheter - Urethral (mL): 1425 mL  Total OUT: 1425 mL    Total NET: 0 mL          LABS:                        10.3   8.92  )-----------( 214      ( 07 May 2025 13:20 )             31.8     05-07    136  |  105  |  36[H]  ----------------------------<  135[H]  4.2   |  16[L]  |  2.11[H]    Ca    8.3[L]      07 May 2025 13:20  Phos  4.6     05-07  Mg     1.7     05-07        Urinalysis Basic - ( 07 May 2025 13:20 )    Color: x / Appearance: x / SG: x / pH: x  Gluc: 135 mg/dL / Ketone: x  / Bili: x / Urobili: x   Blood: x / Protein: x / Nitrite: x   Leuk Esterase: x / RBC: x / WBC x   Sq Epi: x / Non Sq Epi: x / Bacteria: x        RADIOLOGY & ADDITIONAL STUDIES:

## 2025-05-09 LAB
ANION GAP SERPL CALC-SCNC: 11 MMOL/L — SIGNIFICANT CHANGE UP (ref 5–17)
BUN SERPL-MCNC: 25 MG/DL — HIGH (ref 7–23)
CALCIUM SERPL-MCNC: 8.2 MG/DL — LOW (ref 8.4–10.5)
CHLORIDE SERPL-SCNC: 112 MMOL/L — HIGH (ref 96–108)
CO2 SERPL-SCNC: 20 MMOL/L — LOW (ref 22–31)
CREAT SERPL-MCNC: 1.94 MG/DL — HIGH (ref 0.5–1.3)
EGFR: 34 ML/MIN/1.73M2 — LOW
EGFR: 34 ML/MIN/1.73M2 — LOW
GLUCOSE SERPL-MCNC: 92 MG/DL — SIGNIFICANT CHANGE UP (ref 70–99)
HCT VFR BLD CALC: 31 % — LOW (ref 39–50)
HGB BLD-MCNC: 9.8 G/DL — LOW (ref 13–17)
MAGNESIUM SERPL-MCNC: 1.7 MG/DL — SIGNIFICANT CHANGE UP (ref 1.6–2.6)
MCHC RBC-ENTMCNC: 27.9 PG — SIGNIFICANT CHANGE UP (ref 27–34)
MCHC RBC-ENTMCNC: 31.6 G/DL — LOW (ref 32–36)
MCV RBC AUTO: 88.3 FL — SIGNIFICANT CHANGE UP (ref 80–100)
NRBC BLD AUTO-RTO: 0 /100 WBCS — SIGNIFICANT CHANGE UP (ref 0–0)
PHOSPHATE SERPL-MCNC: 2.8 MG/DL — SIGNIFICANT CHANGE UP (ref 2.5–4.5)
PLATELET # BLD AUTO: 242 K/UL — SIGNIFICANT CHANGE UP (ref 150–400)
POTASSIUM SERPL-MCNC: 4.3 MMOL/L — SIGNIFICANT CHANGE UP (ref 3.5–5.3)
POTASSIUM SERPL-SCNC: 4.3 MMOL/L — SIGNIFICANT CHANGE UP (ref 3.5–5.3)
RBC # BLD: 3.51 M/UL — LOW (ref 4.2–5.8)
RBC # FLD: 18.4 % — HIGH (ref 10.3–14.5)
SODIUM SERPL-SCNC: 143 MMOL/L — SIGNIFICANT CHANGE UP (ref 135–145)
WBC # BLD: 6.59 K/UL — SIGNIFICANT CHANGE UP (ref 3.8–10.5)
WBC # FLD AUTO: 6.59 K/UL — SIGNIFICANT CHANGE UP (ref 3.8–10.5)

## 2025-05-09 RX ORDER — MAGNESIUM SULFATE 500 MG/ML
1 SYRINGE (ML) INJECTION ONCE
Refills: 0 | Status: COMPLETED | OUTPATIENT
Start: 2025-05-09 | End: 2025-05-09

## 2025-05-09 RX ORDER — POTASSIUM PHOSPHATE, MONOBASIC POTASSIUM PHOSPHATE, DIBASIC INJECTION, 236; 224 MG/ML; MG/ML
15 SOLUTION, CONCENTRATE INTRAVENOUS ONCE
Refills: 0 | Status: COMPLETED | OUTPATIENT
Start: 2025-05-09 | End: 2025-05-09

## 2025-05-09 RX ADMIN — Medication 100 GRAM(S): at 07:24

## 2025-05-09 RX ADMIN — ERYTHROMYCIN 1 APPLICATION(S): 5 OINTMENT OPHTHALMIC at 17:44

## 2025-05-09 RX ADMIN — OXYCODONE HYDROCHLORIDE 2.5 MILLIGRAM(S): 30 TABLET ORAL at 11:34

## 2025-05-09 RX ADMIN — Medication 1000 MILLIGRAM(S): at 05:53

## 2025-05-09 RX ADMIN — ERYTHROMYCIN 1 APPLICATION(S): 5 OINTMENT OPHTHALMIC at 05:54

## 2025-05-09 RX ADMIN — POTASSIUM PHOSPHATE, MONOBASIC POTASSIUM PHOSPHATE, DIBASIC INJECTION, 62.5 MILLIMOLE(S): 236; 224 SOLUTION, CONCENTRATE INTRAVENOUS at 10:28

## 2025-05-09 RX ADMIN — Medication 1000 MILLIGRAM(S): at 11:45

## 2025-05-09 RX ADMIN — AMLODIPINE BESYLATE 5 MILLIGRAM(S): 10 TABLET ORAL at 05:53

## 2025-05-09 RX ADMIN — HEPARIN SODIUM 5000 UNIT(S): 1000 INJECTION INTRAVENOUS; SUBCUTANEOUS at 05:53

## 2025-05-09 RX ADMIN — Medication 1000 MILLIGRAM(S): at 23:49

## 2025-05-09 RX ADMIN — SERTRALINE 100 MILLIGRAM(S): 100 TABLET, FILM COATED ORAL at 11:45

## 2025-05-09 RX ADMIN — HEPARIN SODIUM 5000 UNIT(S): 1000 INJECTION INTRAVENOUS; SUBCUTANEOUS at 22:04

## 2025-05-09 RX ADMIN — OXYCODONE HYDROCHLORIDE 2.5 MILLIGRAM(S): 30 TABLET ORAL at 10:34

## 2025-05-09 RX ADMIN — HEPARIN SODIUM 5000 UNIT(S): 1000 INJECTION INTRAVENOUS; SUBCUTANEOUS at 14:28

## 2025-05-09 RX ADMIN — Medication 1000 MILLIGRAM(S): at 17:44

## 2025-05-09 NOTE — PROGRESS NOTE ADULT - ASSESSMENT
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 Preop CT staging negative for mets. CEA: 7.2. S/p extended right hemicolectomy on 5/7.     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 Preop CT staging negative for mets. CEA: 7.2. S/p extended right hemicolectomy on 5/7.     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 Preop CT staging negative for mets. CEA: 7.2. S/p  right hemicolectomy on 5/7.       LRD/IVF at 40 cc/hr  ERAS  Erythromycin opthalamic ointment   Pain/Nausea PRN   Pain/Nausea control PRN   SQH/SCDs/OOBA/IS

## 2025-05-09 NOTE — PROGRESS NOTE ADULT - SUBJECTIVE AND OBJECTIVE BOX
INTERVAL HPI/OVERNIGHT EVENTS: ON: CHRISTINA    STATUS POST:  5/7- RA extended Right - EBL 50, IVF 2000,     POST OPERATIVE DAY #: 2    SUBJECTIVE: Pt seen and examined at bedside this am by surgery team.    MEDICATIONS  (STANDING):  acetaminophen     Tablet .. 1000 milliGRAM(s) Oral every 6 hours  amLODIPine   Tablet 5 milliGRAM(s) Oral daily  erythromycin   Ointment 1 Application(s) Left EYE two times a day  heparin   Injectable 5000 Unit(s) SubCutaneous every 8 hours  mometasone 220 MICROgram(s) Inhaler 1 Puff(s) Inhalation daily  sertraline 100 milliGRAM(s) Oral daily  sodium chloride 0.9%. 1000 milliLiter(s) (40 mL/Hr) IV Continuous <Continuous>    MEDICATIONS  (PRN):  metoclopramide Injectable 10 milliGRAM(s) IV Push every 6 hours PRN Nausea and/or Vomiting  oxyCODONE    IR 2.5 milliGRAM(s) Oral every 6 hours PRN Moderate Pain (4 - 6)  oxyCODONE    IR 5 milliGRAM(s) Oral every 6 hours PRN Severe Pain (7 - 10)      Vital Signs Last 24 Hrs  T(C): 37.1 (09 May 2025 05:09), Max: 37.1 (09 May 2025 05:09)  T(F): 98.7 (09 May 2025 05:09), Max: 98.7 (09 May 2025 05:09)  HR: 74 (09 May 2025 05:09) (74 - 84)  BP: 146/68 (09 May 2025 05:09) (123/66 - 157/79)  BP(mean): 105 (09 May 2025 00:15) (89 - 110)  RR: 17 (09 May 2025 05:09) (17 - 18)  SpO2: 97% (09 May 2025 05:09) (93% - 97%)    Parameters below as of 09 May 2025 05:09  Patient On (Oxygen Delivery Method): nasal cannula  O2 Flow (L/min): 2      Physical Exam:  Constitutional: A&Ox3, NAD  Respiratory: normal work of breathing  Cardiovascular: NSR, RRR  Abdomen: soft, non-tender, non distended, no rebound or guarding  Incision:  Ostomy:  Genitourinary:  Legs: WWP, SCDs in place, no calf tenderness        I&O's Detail    07 May 2025 07:01  -  08 May 2025 07:00  --------------------------------------------------------  IN:    Oral Fluid: 325 mL    sodium chloride 0.9%: 1440 mL  Total IN: 1765 mL    OUT:    Indwelling Catheter - Urethral (mL): 1425 mL  Total OUT: 1425 mL    Total NET: 340 mL      08 May 2025 07:01  -  09 May 2025 05:59  --------------------------------------------------------  IN:    sodium chloride 0.9%: 640 mL    sodium chloride 0.9%: 480 mL  Total IN: 1120 mL    OUT:    Voided (mL): 950 mL  Total OUT: 950 mL    Total NET: 170 mL          LABS:                        10.8   7.50  )-----------( 245      ( 08 May 2025 09:42 )             34.4     05-08    139  |  109[H]  |  30[H]  ----------------------------<  98  4.4   |  16[L]  |  2.10[H]    Ca    8.6      08 May 2025 06:50  Phos  4.8     05-08  Mg     1.9     05-08        Urinalysis Basic - ( 08 May 2025 06:50 )    Color: x / Appearance: x / SG: x / pH: x  Gluc: 98 mg/dL / Ketone: x  / Bili: x / Urobili: x   Blood: x / Protein: x / Nitrite: x   Leuk Esterase: x / RBC: x / WBC x   Sq Epi: x / Non Sq Epi: x / Bacteria: x        RADIOLOGY & ADDITIONAL STUDIES: INTERVAL HPI/OVERNIGHT EVENTS: ON: CHRISTINA    STATUS POST:  5/7- RA extended Right - EBL 50, IVF 2000,     POST OPERATIVE DAY #: 2    SUBJECTIVE: Pt seen and examined at bedside this am by surgery team. Pt reports pain overnight, for which he took acetaminophen w/ minimal relief. Pt denies n/v, +BM/flatus. He is monitoring LRD. Pt reports burning in rt eye but pain is minimal.     MEDICATIONS  (STANDING):  acetaminophen     Tablet .. 1000 milliGRAM(s) Oral every 6 hours  amLODIPine   Tablet 5 milliGRAM(s) Oral daily  erythromycin   Ointment 1 Application(s) Left EYE two times a day  heparin   Injectable 5000 Unit(s) SubCutaneous every 8 hours  mometasone 220 MICROgram(s) Inhaler 1 Puff(s) Inhalation daily  sertraline 100 milliGRAM(s) Oral daily  sodium chloride 0.9%. 1000 milliLiter(s) (40 mL/Hr) IV Continuous <Continuous>    MEDICATIONS  (PRN):  metoclopramide Injectable 10 milliGRAM(s) IV Push every 6 hours PRN Nausea and/or Vomiting  oxyCODONE    IR 2.5 milliGRAM(s) Oral every 6 hours PRN Moderate Pain (4 - 6)  oxyCODONE    IR 5 milliGRAM(s) Oral every 6 hours PRN Severe Pain (7 - 10)      Vital Signs Last 24 Hrs  T(C): 37.1 (09 May 2025 05:09), Max: 37.1 (09 May 2025 05:09)  T(F): 98.7 (09 May 2025 05:09), Max: 98.7 (09 May 2025 05:09)  HR: 74 (09 May 2025 05:09) (74 - 84)  BP: 146/68 (09 May 2025 05:09) (123/66 - 157/79)  BP(mean): 105 (09 May 2025 00:15) (89 - 110)  RR: 17 (09 May 2025 05:09) (17 - 18)  SpO2: 97% (09 May 2025 05:09) (93% - 97%)    Parameters below as of 09 May 2025 05:09  Patient On (Oxygen Delivery Method): nasal cannula  O2 Flow (L/min): 2      Physical Exam:  Constitutional: A&Ox3, NAD  Respiratory: normal work of breathing  Cardiovascular: NSR, RRR  Abdomen: soft, non-tender, non distended, no rebound or guarding  Incision: C/D/I, ecchymosis around midline incision.   Ostomy:  Genitourinary: voiding.   Legs: WWP, SCDs in place, no calf tenderness        I&O's Detail    07 May 2025 07:01  -  08 May 2025 07:00  --------------------------------------------------------  IN:    Oral Fluid: 325 mL    sodium chloride 0.9%: 1440 mL  Total IN: 1765 mL    OUT:    Indwelling Catheter - Urethral (mL): 1425 mL  Total OUT: 1425 mL    Total NET: 340 mL      08 May 2025 07:01  -  09 May 2025 05:59  --------------------------------------------------------  IN:    sodium chloride 0.9%: 640 mL    sodium chloride 0.9%: 480 mL  Total IN: 1120 mL    OUT:    Voided (mL): 950 mL  Total OUT: 950 mL    Total NET: 170 mL          LABS:                        10.8   7.50  )-----------( 245      ( 08 May 2025 09:42 )             34.4     05-08    139  |  109[H]  |  30[H]  ----------------------------<  98  4.4   |  16[L]  |  2.10[H]    Ca    8.6      08 May 2025 06:50  Phos  4.8     05-08  Mg     1.9     05-08        Urinalysis Basic - ( 08 May 2025 06:50 )    Color: x / Appearance: x / SG: x / pH: x  Gluc: 98 mg/dL / Ketone: x  / Bili: x / Urobili: x   Blood: x / Protein: x / Nitrite: x   Leuk Esterase: x / RBC: x / WBC x   Sq Epi: x / Non Sq Epi: x / Bacteria: x        RADIOLOGY & ADDITIONAL STUDIES: INTERVAL HPI/OVERNIGHT EVENTS: ON: CHRISTINA    STATUS POST:  5/7- RA  Right - EBL 50, IVF 2000,     POST OPERATIVE DAY #: 2    SUBJECTIVE: Pt seen and examined at bedside this am by surgery team. Pt reports pain overnight, for which he took acetaminophen w/ minimal relief. Pt denies n/v, +BM/flatus. He is monitoring LRD. Pt reports burning in rt eye but pain is minimal.     MEDICATIONS  (STANDING):  acetaminophen     Tablet .. 1000 milliGRAM(s) Oral every 6 hours  amLODIPine   Tablet 5 milliGRAM(s) Oral daily  erythromycin   Ointment 1 Application(s) Left EYE two times a day  heparin   Injectable 5000 Unit(s) SubCutaneous every 8 hours  mometasone 220 MICROgram(s) Inhaler 1 Puff(s) Inhalation daily  sertraline 100 milliGRAM(s) Oral daily  sodium chloride 0.9%. 1000 milliLiter(s) (40 mL/Hr) IV Continuous <Continuous>    MEDICATIONS  (PRN):  metoclopramide Injectable 10 milliGRAM(s) IV Push every 6 hours PRN Nausea and/or Vomiting  oxyCODONE    IR 2.5 milliGRAM(s) Oral every 6 hours PRN Moderate Pain (4 - 6)  oxyCODONE    IR 5 milliGRAM(s) Oral every 6 hours PRN Severe Pain (7 - 10)      Vital Signs Last 24 Hrs  T(C): 37.1 (09 May 2025 05:09), Max: 37.1 (09 May 2025 05:09)  T(F): 98.7 (09 May 2025 05:09), Max: 98.7 (09 May 2025 05:09)  HR: 74 (09 May 2025 05:09) (74 - 84)  BP: 146/68 (09 May 2025 05:09) (123/66 - 157/79)  BP(mean): 105 (09 May 2025 00:15) (89 - 110)  RR: 17 (09 May 2025 05:09) (17 - 18)  SpO2: 97% (09 May 2025 05:09) (93% - 97%)    Parameters below as of 09 May 2025 05:09  Patient On (Oxygen Delivery Method): nasal cannula  O2 Flow (L/min): 2      Physical Exam:  Constitutional: A&Ox3, NAD  Respiratory: normal work of breathing  Cardiovascular: NSR, RRR  Abdomen: soft, non-tender, non distended, no rebound or guarding  Incision: C/D/I, ecchymosis around midline incision.   Ostomy:  Genitourinary: voiding.   Legs: WWP, SCDs in place, no calf tenderness        I&O's Detail    07 May 2025 07:01  -  08 May 2025 07:00  --------------------------------------------------------  IN:    Oral Fluid: 325 mL    sodium chloride 0.9%: 1440 mL  Total IN: 1765 mL    OUT:    Indwelling Catheter - Urethral (mL): 1425 mL  Total OUT: 1425 mL    Total NET: 340 mL      08 May 2025 07:01  -  09 May 2025 05:59  --------------------------------------------------------  IN:    sodium chloride 0.9%: 640 mL    sodium chloride 0.9%: 480 mL  Total IN: 1120 mL    OUT:    Voided (mL): 950 mL  Total OUT: 950 mL    Total NET: 170 mL          LABS:                        10.8   7.50  )-----------( 245      ( 08 May 2025 09:42 )             34.4     05-08    139  |  109[H]  |  30[H]  ----------------------------<  98  4.4   |  16[L]  |  2.10[H]    Ca    8.6      08 May 2025 06:50  Phos  4.8     05-08  Mg     1.9     05-08        Urinalysis Basic - ( 08 May 2025 06:50 )    Color: x / Appearance: x / SG: x / pH: x  Gluc: 98 mg/dL / Ketone: x  / Bili: x / Urobili: x   Blood: x / Protein: x / Nitrite: x   Leuk Esterase: x / RBC: x / WBC x   Sq Epi: x / Non Sq Epi: x / Bacteria: x        RADIOLOGY & ADDITIONAL STUDIES:

## 2025-05-10 ENCOUNTER — TRANSCRIPTION ENCOUNTER (OUTPATIENT)
Age: 85
End: 2025-05-10

## 2025-05-10 VITALS
OXYGEN SATURATION: 94 % | RESPIRATION RATE: 18 BRPM | TEMPERATURE: 98 F | HEART RATE: 79 BPM | DIASTOLIC BLOOD PRESSURE: 75 MMHG | SYSTOLIC BLOOD PRESSURE: 148 MMHG

## 2025-05-10 RX ORDER — DOCUSATE SODIUM 100 MG
1 CAPSULE ORAL
Qty: 14 | Refills: 0
Start: 2025-05-10 | End: 2025-05-16

## 2025-05-10 RX ORDER — OXYCODONE HYDROCHLORIDE 30 MG/1
1 TABLET ORAL
Qty: 12 | Refills: 0
Start: 2025-05-10 | End: 2025-05-12

## 2025-05-10 RX ADMIN — AMLODIPINE BESYLATE 5 MILLIGRAM(S): 10 TABLET ORAL at 06:04

## 2025-05-10 RX ADMIN — HEPARIN SODIUM 5000 UNIT(S): 1000 INJECTION INTRAVENOUS; SUBCUTANEOUS at 06:03

## 2025-05-10 RX ADMIN — Medication 1000 MILLIGRAM(S): at 06:03

## 2025-05-10 NOTE — DISCHARGE NOTE NURSING/CASE MANAGEMENT/SOCIAL WORK - FINANCIAL ASSISTANCE
[facility] provides services at a reduced cost to those who are determined to be eligible through [facility]’s financial assistance program. Information regarding [facility]’s financial assistance program can be found by going to [email or web address] or by calling [phone].

## 2025-05-10 NOTE — DISCHARGE NOTE NURSING/CASE MANAGEMENT/SOCIAL WORK - PATIENT PORTAL LINK FT
You can access the FollowMyHealth Patient Portal offered by [facility] by registering at the following website: [email or web address]. By joining [facility]’s FollowMyHealth portal, you will also be able to view your health information using other applications (apps) compatible with our system.

## 2025-05-10 NOTE — PROGRESS NOTE ADULT - SUBJECTIVE AND OBJECTIVE BOX
INTERVAL HPI/OVERNIGHT EVENTS: romana diet, -n/-v, +f/-bm, pain controlled    STATUS POST:  5/7- RA extended Right - EBL 50, IVF 2000,     POST OPERATIVE DAY #: 3    SUBJECTIVE: Pt reports doing better. Tolerating diet, passing flatus and having BMs. Pain is tolerable.     MEDICATIONS  (STANDING):  acetaminophen     Tablet .. 1000 milliGRAM(s) Oral every 6 hours  amLODIPine   Tablet 5 milliGRAM(s) Oral daily  heparin   Injectable 5000 Unit(s) SubCutaneous every 8 hours  mometasone 220 MICROgram(s) Inhaler 1 Puff(s) Inhalation daily  sertraline 100 milliGRAM(s) Oral daily    MEDICATIONS  (PRN):  metoclopramide Injectable 10 milliGRAM(s) IV Push every 6 hours PRN Nausea and/or Vomiting  oxyCODONE    IR 2.5 milliGRAM(s) Oral every 6 hours PRN Moderate Pain (4 - 6)  oxyCODONE    IR 5 milliGRAM(s) Oral every 6 hours PRN Severe Pain (7 - 10)      Vital Signs Last 24 Hrs  T(C): 36.9 (10 May 2025 05:12), Max: 36.9 (09 May 2025 08:42)  T(F): 98.4 (10 May 2025 05:12), Max: 98.4 (09 May 2025 08:42)  HR: 86 (10 May 2025 05:12) (56 - 86)  BP: 153/79 (10 May 2025 05:12) (145/83 - 153/79)  BP(mean): --  RR: 17 (10 May 2025 05:12) (17 - 18)  SpO2: 91% (10 May 2025 05:12) (91% - 94%)    Parameters below as of 10 May 2025 05:12  Patient On (Oxygen Delivery Method): nasal cannula  O2 Flow (L/min): 2      PHYSICAL EXAM:    Constitutional: A&Ox3, nad  Respiratory: non labored breathing, no respiratory distress  Cardiovascular: NSR, RRR  Gastrointestinal: abd soft, NT, mildly distended                  Incision: C/D/I with bruising at incision sites  Genitourinary: voiding  Extremities: (-) edema, wwp, SCDs in place                  I&O's Detail    09 May 2025 07:01  -  10 May 2025 07:00  --------------------------------------------------------  IN:    Oral Fluid: 540 mL  Total IN: 540 mL    OUT:    Voided (mL): 1400 mL  Total OUT: 1400 mL    Total NET: -860 mL          LABS:                        9.8    6.59  )-----------( 242      ( 09 May 2025 06:00 )             31.0     05-09    143  |  112[H]  |  25[H]  ----------------------------<  92  4.3   |  20[L]  |  1.94[H]    Ca    8.2[L]      09 May 2025 06:00  Phos  2.8     05-09  Mg     1.7     05-09        Urinalysis Basic - ( 09 May 2025 06:00 )    Color: x / Appearance: x / SG: x / pH: x  Gluc: 92 mg/dL / Ketone: x  / Bili: x / Urobili: x   Blood: x / Protein: x / Nitrite: x   Leuk Esterase: x / RBC: x / WBC x   Sq Epi: x / Non Sq Epi: x / Bacteria: x        RADIOLOGY & ADDITIONAL STUDIES:

## 2025-05-10 NOTE — DISCHARGE NOTE NURSING/CASE MANAGEMENT/SOCIAL WORK - NSDCPETBCESMAN_GEN_ALL_CORE
Problem: Discharge Planning:  Goal: Discharged to appropriate level of care  Outcome: Ongoing If you are a smoker, it is important for your health to stop smoking. Please be aware that second hand smoke is also harmful.

## 2025-05-10 NOTE — PROGRESS NOTE ADULT - ASSESSMENT
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 Preop CT staging negative for mets. CEA: 7.2. S/p extended right hemicolectomy on 5/7.     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 Preop CT staging negative for mets. CEA: 7.2. S/p extended right hemicolectomy on 5/7.     LRD  ERAS  Erythromycin opthalamic ointment   Pain/Nausea PRN   Pain/Nausea control PRN   SQH/SCDs/OOBA/IS   No AM Labs   dc today

## 2025-05-12 ENCOUNTER — TRANSCRIPTION ENCOUNTER (OUTPATIENT)
Age: 85
End: 2025-05-12

## 2025-05-15 ENCOUNTER — TRANSCRIPTION ENCOUNTER (OUTPATIENT)
Age: 85
End: 2025-05-15

## 2025-05-15 DIAGNOSIS — K21.9 GASTRO-ESOPHAGEAL REFLUX DISEASE WITHOUT ESOPHAGITIS: ICD-10-CM

## 2025-05-15 DIAGNOSIS — Z87.891 PERSONAL HISTORY OF NICOTINE DEPENDENCE: ICD-10-CM

## 2025-05-15 DIAGNOSIS — I12.9 HYPERTENSIVE CHRONIC KIDNEY DISEASE WITH STAGE 1 THROUGH STAGE 4 CHRONIC KIDNEY DISEASE, OR UNSPECIFIED CHRONIC KIDNEY DISEASE: ICD-10-CM

## 2025-05-15 DIAGNOSIS — C61 MALIGNANT NEOPLASM OF PROSTATE: ICD-10-CM

## 2025-05-15 DIAGNOSIS — C18.4 MALIGNANT NEOPLASM OF TRANSVERSE COLON: ICD-10-CM

## 2025-05-15 DIAGNOSIS — N18.4 CHRONIC KIDNEY DISEASE, STAGE 4 (SEVERE): ICD-10-CM

## 2025-05-15 DIAGNOSIS — D64.9 ANEMIA, UNSPECIFIED: ICD-10-CM

## 2025-05-15 DIAGNOSIS — J44.9 CHRONIC OBSTRUCTIVE PULMONARY DISEASE, UNSPECIFIED: ICD-10-CM

## 2025-05-15 DIAGNOSIS — M19.90 UNSPECIFIED OSTEOARTHRITIS, UNSPECIFIED SITE: ICD-10-CM

## 2025-05-15 DIAGNOSIS — N40.0 BENIGN PROSTATIC HYPERPLASIA WITHOUT LOWER URINARY TRACT SYMPTOMS: ICD-10-CM

## 2025-05-15 DIAGNOSIS — R19.00 INTRA-ABDOMINAL AND PELVIC SWELLING, MASS AND LUMP, UNSPECIFIED SITE: ICD-10-CM

## 2025-05-15 DIAGNOSIS — M10.9 GOUT, UNSPECIFIED: ICD-10-CM

## 2025-05-20 ENCOUNTER — APPOINTMENT (OUTPATIENT)
Dept: SURGICAL ONCOLOGY | Facility: CLINIC | Age: 85
End: 2025-05-20
Payer: MEDICARE

## 2025-05-20 VITALS
SYSTOLIC BLOOD PRESSURE: 136 MMHG | TEMPERATURE: 98.1 F | HEART RATE: 87 BPM | OXYGEN SATURATION: 99 % | RESPIRATION RATE: 16 BRPM | DIASTOLIC BLOOD PRESSURE: 72 MMHG | WEIGHT: 137 LBS | BODY MASS INDEX: 22.82 KG/M2 | HEIGHT: 65 IN

## 2025-05-20 DIAGNOSIS — C18.9 MALIGNANT NEOPLASM OF COLON, UNSPECIFIED: ICD-10-CM

## 2025-05-20 PROCEDURE — 99024 POSTOP FOLLOW-UP VISIT: CPT

## 2025-06-02 PROCEDURE — 82962 GLUCOSE BLOOD TEST: CPT

## 2025-06-02 PROCEDURE — 86850 RBC ANTIBODY SCREEN: CPT

## 2025-06-02 PROCEDURE — 88309 TISSUE EXAM BY PATHOLOGIST: CPT

## 2025-06-02 PROCEDURE — 86901 BLOOD TYPING SEROLOGIC RH(D): CPT

## 2025-06-02 PROCEDURE — S2900: CPT

## 2025-06-02 PROCEDURE — 86900 BLOOD TYPING SEROLOGIC ABO: CPT

## 2025-06-02 PROCEDURE — 84100 ASSAY OF PHOSPHORUS: CPT

## 2025-06-02 PROCEDURE — 85027 COMPLETE CBC AUTOMATED: CPT

## 2025-06-02 PROCEDURE — C9399: CPT

## 2025-06-02 PROCEDURE — 83735 ASSAY OF MAGNESIUM: CPT

## 2025-06-02 PROCEDURE — 80048 BASIC METABOLIC PNL TOTAL CA: CPT

## 2025-06-02 PROCEDURE — 36415 COLL VENOUS BLD VENIPUNCTURE: CPT

## 2025-06-02 PROCEDURE — C1889: CPT

## 2025-09-18 ENCOUNTER — APPOINTMENT (OUTPATIENT)
Dept: PHARMACY | Facility: CLINIC | Age: 85
End: 2025-09-18
Payer: SELF-PAY

## 2025-09-18 PROCEDURE — V5299A: CUSTOM

## (undated) DEVICE — TROCAR APPLIED MEDICAL KII BALLOON BLUNT TIP 12MM X 100MM

## (undated) DEVICE — KNIFE ALCON STANDARD FULL HANDLE 15 DEG (PINK)

## (undated) DEVICE — SUT VICRYL 2-0 27" SH UNDYED

## (undated) DEVICE — SOL IRR BAL SALT 500ML

## (undated) DEVICE — MARKING PEN W RULER

## (undated) DEVICE — XI DRAPE ARM

## (undated) DEVICE — NUCLEUS HYDRODISSECTOR PEARCE ANGLED 25G X 22MM

## (undated) DEVICE — PACK GENERAL LAPAROSCOPY

## (undated) DEVICE — D HELP - CLEARVIEW CLEARIFY SYSTEM

## (undated) DEVICE — TRANSFORMER INTREPID I/A 0.3MM

## (undated) DEVICE — Device

## (undated) DEVICE — GLV 6.5 PROTEXIS (WHITE)

## (undated) DEVICE — SUT PROLENE 1 30" CT

## (undated) DEVICE — XI DRAPE COLUMN

## (undated) DEVICE — PACK CENTURION FMS ACT.9 30 BAL

## (undated) DEVICE — SUT PDO 0 1/2 CIRCLE 22MM NDL 20CM

## (undated) DEVICE — SUT PDS II 3-0 27" SH

## (undated) DEVICE — DRAPE MICROSCOPE KNOB COVER SMALL (2 PCS)

## (undated) DEVICE — PREP CHLORAPREP TEAL 26ML

## (undated) DEVICE — DISSECTOR ENDOSCOPIC KITTNER SINGLE TIP

## (undated) DEVICE — STAPLER SUREFORM CRV TIP 30X8MM

## (undated) DEVICE — TIP METZENBAUM SCISSOR MONOPOLAR ENDOCUT (ORANGE)

## (undated) DEVICE — SUT MONOCRYL 4-0 27" PS-2 UNDYED

## (undated) DEVICE — XI VESSEL SEALER

## (undated) DEVICE — SUT VICRYL 0 27" UR-6

## (undated) DEVICE — DRSG GAUZE PACKTNER ROLL

## (undated) DEVICE — SUT VICRYL 4-0 27" RB-1 UNDYED

## (undated) DEVICE — STAPLER COVIDIEN ENDO GIA STANDARD HANDLE

## (undated) DEVICE — APPLICATOR COTTON TIP 6"

## (undated) DEVICE — CLIP APPLR DISP 5MM

## (undated) DEVICE — SYR LUER LOK 1CC

## (undated) DEVICE — GLV 7.5 PROTEXIS (WHITE)

## (undated) DEVICE — SUT VICRYL 3-0 27" SH

## (undated) DEVICE — BLADE SURGICAL #15 CARBON

## (undated) DEVICE — SUT VICRYL 3-0 27" CT-1

## (undated) DEVICE — ELCTR BOVIE PENCIL HANDPIECE ROCKER SWITCH 15FT

## (undated) DEVICE — POSITIONER FOAM EGG CRATE ULNAR 2PCS (PINK)

## (undated) DEVICE — DRSG DERMABOND 0.7ML

## (undated) DEVICE — POSITIONER PINK PAD PIGAZZI SYSTEM XL W ARM PROTECTOR

## (undated) DEVICE — CLIPPER BLADE GENERAL USE

## (undated) DEVICE — XI OBTURATOR OPTICAL BLADELESS 8MM

## (undated) DEVICE — VENODYNE/SCD SLEEVE CALF MEDIUM

## (undated) DEVICE — KNIFE ALCON SLIT INTREPID CLEAR-CUT SAFETY 2.4MM

## (undated) DEVICE — ELECTRO LUBE ANTI-STICK SOLUTION FOR CAUTERY TIP

## (undated) DEVICE — PACK ABDOMINAL GENERAL CLOSING

## (undated) DEVICE — TROCAR APPLIED MEDICAL KII FIOS FIRST ENTRY 5MM X 100MM Z-THREAD

## (undated) DEVICE — GLV 7 PROTEXIS (WHITE)

## (undated) DEVICE — XI STAPLER SUREFORM 60

## (undated) DEVICE — SUT VICRYL 2-0 18" CT-1 UNDYED (POP-OFF)

## (undated) DEVICE — TUBING SUCTION NONCONDUCTIVE 6MM X 12FT

## (undated) DEVICE — GLV 8 DERMAPRENE (GREEN)

## (undated) DEVICE — XI SEAL UNIVERSIAL 5-12MM